# Patient Record
Sex: FEMALE | Race: WHITE | NOT HISPANIC OR LATINO | Employment: OTHER | URBAN - METROPOLITAN AREA
[De-identification: names, ages, dates, MRNs, and addresses within clinical notes are randomized per-mention and may not be internally consistent; named-entity substitution may affect disease eponyms.]

---

## 2017-01-03 ENCOUNTER — APPOINTMENT (OUTPATIENT)
Dept: PHYSICAL THERAPY | Facility: CLINIC | Age: 67
End: 2017-01-03
Payer: MEDICARE

## 2017-01-03 ENCOUNTER — APPOINTMENT (OUTPATIENT)
Dept: OCCUPATIONAL THERAPY | Facility: CLINIC | Age: 67
End: 2017-01-03
Payer: MEDICARE

## 2017-01-03 PROCEDURE — 97112 NEUROMUSCULAR REEDUCATION: CPT

## 2017-01-03 PROCEDURE — 97535 SELF CARE MNGMENT TRAINING: CPT

## 2017-01-06 ENCOUNTER — APPOINTMENT (OUTPATIENT)
Dept: PHYSICAL THERAPY | Facility: CLINIC | Age: 67
End: 2017-01-06
Payer: MEDICARE

## 2017-01-06 ENCOUNTER — APPOINTMENT (OUTPATIENT)
Dept: OCCUPATIONAL THERAPY | Facility: CLINIC | Age: 67
End: 2017-01-06
Payer: MEDICARE

## 2017-01-06 PROCEDURE — G8990 OTHER PT/OT CURRENT STATUS: HCPCS

## 2017-01-06 PROCEDURE — G8992 OTHER PT/OT  D/C STATUS: HCPCS

## 2017-01-06 PROCEDURE — 97535 SELF CARE MNGMENT TRAINING: CPT

## 2017-01-06 PROCEDURE — 97112 NEUROMUSCULAR REEDUCATION: CPT

## 2017-01-06 PROCEDURE — G8991 OTHER PT/OT GOAL STATUS: HCPCS

## 2017-01-10 ENCOUNTER — APPOINTMENT (OUTPATIENT)
Dept: OCCUPATIONAL THERAPY | Facility: CLINIC | Age: 67
End: 2017-01-10
Payer: MEDICARE

## 2017-01-10 ENCOUNTER — APPOINTMENT (OUTPATIENT)
Dept: PHYSICAL THERAPY | Facility: CLINIC | Age: 67
End: 2017-01-10
Payer: MEDICARE

## 2017-01-10 PROCEDURE — 97112 NEUROMUSCULAR REEDUCATION: CPT

## 2017-01-13 ENCOUNTER — APPOINTMENT (OUTPATIENT)
Dept: PHYSICAL THERAPY | Facility: CLINIC | Age: 67
End: 2017-01-13
Payer: MEDICARE

## 2017-01-13 ENCOUNTER — APPOINTMENT (OUTPATIENT)
Dept: OCCUPATIONAL THERAPY | Facility: CLINIC | Age: 67
End: 2017-01-13
Payer: MEDICARE

## 2017-01-13 PROCEDURE — 97112 NEUROMUSCULAR REEDUCATION: CPT

## 2017-03-17 ENCOUNTER — HOSPITAL ENCOUNTER (OUTPATIENT)
Dept: RADIOLOGY | Age: 67
Discharge: HOME | End: 2017-03-17
Payer: MEDICARE

## 2017-03-17 DIAGNOSIS — Z12.31 ENCOUNTER FOR SCREENING MAMMOGRAM FOR MALIGNANT NEOPLASM OF BREAST: ICD-10-CM

## 2017-03-17 PROCEDURE — G0202 SCR MAMMO BI INCL CAD: HCPCS

## 2017-03-27 ENCOUNTER — ALLSCRIPTS OFFICE VISIT (OUTPATIENT)
Dept: OTHER | Facility: OTHER | Age: 67
End: 2017-03-27

## 2017-04-22 ENCOUNTER — APPOINTMENT (OUTPATIENT)
Dept: LAB | Facility: CLINIC | Age: 67
End: 2017-04-22
Payer: MEDICARE

## 2017-04-22 ENCOUNTER — TRANSCRIBE ORDERS (OUTPATIENT)
Dept: LAB | Facility: CLINIC | Age: 67
End: 2017-04-22

## 2017-04-22 DIAGNOSIS — R73.09 OTHER ABNORMAL GLUCOSE: ICD-10-CM

## 2017-04-22 DIAGNOSIS — E21.3 HYPERPARATHYROIDISM (HCC): ICD-10-CM

## 2017-04-22 DIAGNOSIS — E78.5 HYPERLIPIDEMIA: ICD-10-CM

## 2017-04-22 LAB
ALBUMIN SERPL BCP-MCNC: 3.7 G/DL (ref 3.5–5)
ALP SERPL-CCNC: 57 U/L (ref 46–116)
ALT SERPL W P-5'-P-CCNC: 26 U/L (ref 12–78)
ANION GAP SERPL CALCULATED.3IONS-SCNC: 8 MMOL/L (ref 4–13)
AST SERPL W P-5'-P-CCNC: 20 U/L (ref 5–45)
BASOPHILS # BLD AUTO: 0.05 THOUSANDS/ΜL (ref 0–0.1)
BASOPHILS NFR BLD AUTO: 1 % (ref 0–1)
BILIRUB SERPL-MCNC: 0.8 MG/DL (ref 0.2–1)
BUN SERPL-MCNC: 19 MG/DL (ref 5–25)
CALCIUM SERPL-MCNC: 9.4 MG/DL (ref 8.3–10.1)
CHLORIDE SERPL-SCNC: 108 MMOL/L (ref 100–108)
CHOLEST SERPL-MCNC: 120 MG/DL (ref 50–200)
CO2 SERPL-SCNC: 27 MMOL/L (ref 21–32)
CREAT SERPL-MCNC: 0.87 MG/DL (ref 0.6–1.3)
EOSINOPHIL # BLD AUTO: 0.24 THOUSAND/ΜL (ref 0–0.61)
EOSINOPHIL NFR BLD AUTO: 5 % (ref 0–6)
ERYTHROCYTE [DISTWIDTH] IN BLOOD BY AUTOMATED COUNT: 14 % (ref 11.6–15.1)
EST. AVERAGE GLUCOSE BLD GHB EST-MCNC: 114 MG/DL
GFR SERPL CREATININE-BSD FRML MDRD: >60 ML/MIN/1.73SQ M
GLUCOSE P FAST SERPL-MCNC: 96 MG/DL (ref 65–99)
HBA1C MFR BLD: 5.6 % (ref 4.2–6.3)
HCT VFR BLD AUTO: 40.9 % (ref 34.8–46.1)
HDLC SERPL-MCNC: 47 MG/DL (ref 40–60)
HGB BLD-MCNC: 13.6 G/DL (ref 11.5–15.4)
LDLC SERPL DIRECT ASSAY-MCNC: 68 MG/DL (ref 0–100)
LYMPHOCYTES # BLD AUTO: 1.95 THOUSANDS/ΜL (ref 0.6–4.47)
LYMPHOCYTES NFR BLD AUTO: 43 % (ref 14–44)
MCH RBC QN AUTO: 30.2 PG (ref 26.8–34.3)
MCHC RBC AUTO-ENTMCNC: 33.3 G/DL (ref 31.4–37.4)
MCV RBC AUTO: 91 FL (ref 82–98)
MONOCYTES # BLD AUTO: 0.34 THOUSAND/ΜL (ref 0.17–1.22)
MONOCYTES NFR BLD AUTO: 8 % (ref 4–12)
NEUTROPHILS # BLD AUTO: 1.98 THOUSANDS/ΜL (ref 1.85–7.62)
NEUTS SEG NFR BLD AUTO: 43 % (ref 43–75)
PLATELET # BLD AUTO: 196 THOUSANDS/UL (ref 149–390)
PMV BLD AUTO: 10 FL (ref 8.9–12.7)
POTASSIUM SERPL-SCNC: 4.6 MMOL/L (ref 3.5–5.3)
PROT SERPL-MCNC: 6.6 G/DL (ref 6.4–8.2)
PTH-INTACT SERPL-MCNC: 60.1 PG/ML (ref 14–72)
RBC # BLD AUTO: 4.51 MILLION/UL (ref 3.81–5.12)
SODIUM SERPL-SCNC: 143 MMOL/L (ref 136–145)
TRIGL SERPL-MCNC: 90 MG/DL
WBC # BLD AUTO: 4.56 THOUSAND/UL (ref 4.31–10.16)

## 2017-04-22 PROCEDURE — 80053 COMPREHEN METABOLIC PANEL: CPT

## 2017-04-22 PROCEDURE — 80061 LIPID PANEL: CPT

## 2017-04-22 PROCEDURE — 83036 HEMOGLOBIN GLYCOSYLATED A1C: CPT

## 2017-04-22 PROCEDURE — 36415 COLL VENOUS BLD VENIPUNCTURE: CPT

## 2017-04-22 PROCEDURE — 83970 ASSAY OF PARATHORMONE: CPT

## 2017-04-22 PROCEDURE — 83721 ASSAY OF BLOOD LIPOPROTEIN: CPT

## 2017-04-22 PROCEDURE — 85025 COMPLETE CBC W/AUTO DIFF WBC: CPT

## 2017-05-10 ENCOUNTER — TRANSCRIBE ORDERS (OUTPATIENT)
Dept: ADMINISTRATIVE | Facility: HOSPITAL | Age: 67
End: 2017-05-10

## 2017-05-10 ENCOUNTER — ALLSCRIPTS OFFICE VISIT (OUTPATIENT)
Dept: OTHER | Facility: OTHER | Age: 67
End: 2017-05-10

## 2017-05-10 DIAGNOSIS — M81.0 AGE-RELATED OSTEOPOROSIS WITHOUT CURRENT PATHOLOGICAL FRACTURE: ICD-10-CM

## 2017-05-10 DIAGNOSIS — R68.84 JAW PAIN: ICD-10-CM

## 2017-05-10 DIAGNOSIS — M89.9 DISORDER OF BONE: ICD-10-CM

## 2017-05-18 ENCOUNTER — HOSPITAL ENCOUNTER (OUTPATIENT)
Dept: RADIOLOGY | Age: 67
Discharge: HOME | End: 2017-05-18
Payer: MEDICARE

## 2017-05-18 DIAGNOSIS — M81.0 AGE-RELATED OSTEOPOROSIS WITHOUT CURRENT PATHOLOGICAL FRACTURE: ICD-10-CM

## 2017-05-18 DIAGNOSIS — M89.9 DISORDER OF BONE: ICD-10-CM

## 2017-05-18 PROCEDURE — 77080 DXA BONE DENSITY AXIAL: CPT

## 2017-05-31 ENCOUNTER — HOSPITAL ENCOUNTER (OUTPATIENT)
Dept: MRI IMAGING | Facility: HOSPITAL | Age: 67
Discharge: HOME | End: 2017-05-31
Payer: MEDICARE

## 2017-05-31 DIAGNOSIS — R68.84 JAW PAIN: ICD-10-CM

## 2017-05-31 PROCEDURE — 70543 MRI ORBT/FAC/NCK W/O &W/DYE: CPT

## 2017-05-31 PROCEDURE — A9585 GADOBUTROL INJECTION: HCPCS | Performed by: RADIOLOGY

## 2017-05-31 RX ADMIN — GADOBUTROL 7 ML: 604.72 INJECTION INTRAVENOUS at 16:35

## 2017-06-26 ENCOUNTER — ALLSCRIPTS OFFICE VISIT (OUTPATIENT)
Dept: OTHER | Facility: OTHER | Age: 67
End: 2017-06-26

## 2017-06-26 DIAGNOSIS — R05.9 COUGH: ICD-10-CM

## 2017-06-26 DIAGNOSIS — R51 HEADACHE(784.0): ICD-10-CM

## 2017-06-27 ENCOUNTER — APPOINTMENT (OUTPATIENT)
Dept: LAB | Facility: CLINIC | Age: 67
End: 2017-06-27
Payer: MEDICARE

## 2017-06-27 ENCOUNTER — TRANSCRIBE ORDERS (OUTPATIENT)
Dept: LAB | Facility: CLINIC | Age: 67
End: 2017-06-27

## 2017-06-27 DIAGNOSIS — R51 HEADACHE(784.0): ICD-10-CM

## 2017-06-27 DIAGNOSIS — R05.9 COUGH: ICD-10-CM

## 2017-06-27 PROCEDURE — 86003 ALLG SPEC IGE CRUDE XTRC EA: CPT

## 2017-06-27 PROCEDURE — 82785 ASSAY OF IGE: CPT

## 2017-06-27 PROCEDURE — 36415 COLL VENOUS BLD VENIPUNCTURE: CPT

## 2017-06-28 LAB
A ALTERNATA IGE QN: <0.1 KUA/I
A FUMIGATUS IGE QN: <0.1 KUA/I
ALLERGEN COMMENT: ABNORMAL
BERMUDA GRASS IGE QN: <0.1 KUA/I
BOXELDER IGE QN: <0.1 KUA/I
C HERBARUM IGE QN: <0.1 KUA/I
CAT DANDER IGE QN: <0.1 KUA/I
CMN PIGWEED IGE QN: <0.1 KUA/I
COMMON RAGWEED IGE QN: 0.98 KUA/I
COTTONWOOD IGE QN: <0.1 KUA/I
D FARINAE IGE QN: <0.1 KUA/I
D PTERONYSS IGE QN: <0.1 KUA/I
DOG DANDER IGE QN: <0.1 KUA/I
LONDON PLANE IGE QN: <0.1 KUA/I
MOUSE URINE PROT IGE QN: <0.1 KUA/I
MT JUNIPER IGE QN: <0.1 KUA/I
MUGWORT IGE QN: 0.1 KUA/I
P NOTATUM IGE QN: <0.1 KUA/I
ROACH IGE QN: <0.1 KUA/I
SHEEP SORREL IGE QN: <0.1 KUA/I
SILVER BIRCH IGE QN: <0.1 KUA/I
TIMOTHY IGE QN: <0.1 KUA/I
TOTAL IGE SMQN RAST: 38.6 KU/L (ref 0–113)
WALNUT IGE QN: <0.1 KUA/I
WHITE ASH IGE QN: <0.1 KUA/I
WHITE ELM IGE QN: <0.1 KUA/I
WHITE MULBERRY IGE QN: <0.1 KUA/I
WHITE OAK IGE QN: <0.1 KUA/I

## 2017-07-03 ENCOUNTER — HOSPITAL ENCOUNTER (OUTPATIENT)
Dept: CT IMAGING | Facility: HOSPITAL | Age: 67
Discharge: HOME | End: 2017-07-03
Payer: MEDICARE

## 2017-07-03 DIAGNOSIS — R51 HEADACHE(784.0): ICD-10-CM

## 2017-07-03 PROCEDURE — 70486 CT MAXILLOFACIAL W/O DYE: CPT

## 2017-09-12 ENCOUNTER — ALLSCRIPTS OFFICE VISIT (OUTPATIENT)
Dept: OTHER | Facility: OTHER | Age: 67
End: 2017-09-12

## 2017-09-12 DIAGNOSIS — Q21.1 ATRIAL SEPTAL DEFECT: ICD-10-CM

## 2017-09-12 DIAGNOSIS — R00.2 PALPITATIONS: ICD-10-CM

## 2017-09-12 DIAGNOSIS — E78.5 HYPERLIPIDEMIA: ICD-10-CM

## 2017-09-12 DIAGNOSIS — I48.91 ATRIAL FIBRILLATION (HCC): ICD-10-CM

## 2017-09-28 ENCOUNTER — HOSPITAL ENCOUNTER (OUTPATIENT)
Dept: NON INVASIVE DIAGNOSTICS | Facility: CLINIC | Age: 67
Discharge: HOME | End: 2017-09-28
Payer: MEDICARE

## 2017-09-28 DIAGNOSIS — Q21.1 ATRIAL SEPTAL DEFECT: ICD-10-CM

## 2017-09-28 DIAGNOSIS — E78.5 HYPERLIPIDEMIA: ICD-10-CM

## 2017-09-28 DIAGNOSIS — R00.2 PALPITATIONS: ICD-10-CM

## 2017-09-28 DIAGNOSIS — I48.91 ATRIAL FIBRILLATION (HCC): ICD-10-CM

## 2017-09-28 PROCEDURE — 93306 TTE W/DOPPLER COMPLETE: CPT

## 2017-10-01 DIAGNOSIS — E78.5 HYPERLIPIDEMIA: ICD-10-CM

## 2017-10-01 DIAGNOSIS — M81.0 AGE-RELATED OSTEOPOROSIS WITHOUT CURRENT PATHOLOGICAL FRACTURE: ICD-10-CM

## 2017-10-01 DIAGNOSIS — E55.9 VITAMIN D DEFICIENCY: ICD-10-CM

## 2017-11-01 ENCOUNTER — APPOINTMENT (OUTPATIENT)
Dept: LAB | Facility: CLINIC | Age: 67
End: 2017-11-01
Payer: MEDICARE

## 2017-11-01 DIAGNOSIS — M81.0 AGE-RELATED OSTEOPOROSIS WITHOUT CURRENT PATHOLOGICAL FRACTURE: ICD-10-CM

## 2017-11-01 DIAGNOSIS — E55.9 VITAMIN D DEFICIENCY: ICD-10-CM

## 2017-11-01 DIAGNOSIS — E78.5 HYPERLIPIDEMIA: ICD-10-CM

## 2017-11-01 LAB
25(OH)D3 SERPL-MCNC: 37.4 NG/ML (ref 30–100)
ALBUMIN SERPL BCP-MCNC: 3.7 G/DL (ref 3.5–5)
ALP SERPL-CCNC: 98 U/L (ref 46–116)
ALT SERPL W P-5'-P-CCNC: 28 U/L (ref 12–78)
ANION GAP SERPL CALCULATED.3IONS-SCNC: 7 MMOL/L (ref 4–13)
AST SERPL W P-5'-P-CCNC: 17 U/L (ref 5–45)
BASOPHILS # BLD AUTO: 0.04 THOUSANDS/ΜL (ref 0–0.1)
BASOPHILS NFR BLD AUTO: 1 % (ref 0–1)
BILIRUB SERPL-MCNC: 0.9 MG/DL (ref 0.2–1)
BUN SERPL-MCNC: 24 MG/DL (ref 5–25)
CALCIUM SERPL-MCNC: 9.7 MG/DL (ref 8.3–10.1)
CHLORIDE SERPL-SCNC: 110 MMOL/L (ref 100–108)
CHOLEST SERPL-MCNC: 127 MG/DL (ref 50–200)
CO2 SERPL-SCNC: 27 MMOL/L (ref 21–32)
CREAT SERPL-MCNC: 0.88 MG/DL (ref 0.6–1.3)
EOSINOPHIL # BLD AUTO: 0.25 THOUSAND/ΜL (ref 0–0.61)
EOSINOPHIL NFR BLD AUTO: 5 % (ref 0–6)
ERYTHROCYTE [DISTWIDTH] IN BLOOD BY AUTOMATED COUNT: 13.9 % (ref 11.6–15.1)
GFR SERPL CREATININE-BSD FRML MDRD: 67 ML/MIN/1.73SQ M
GLUCOSE P FAST SERPL-MCNC: 96 MG/DL (ref 65–99)
HCT VFR BLD AUTO: 43.1 % (ref 34.8–46.1)
HDLC SERPL-MCNC: 52 MG/DL (ref 40–60)
HGB BLD-MCNC: 14.1 G/DL (ref 11.5–15.4)
LDLC SERPL DIRECT ASSAY-MCNC: 76 MG/DL (ref 0–100)
LYMPHOCYTES # BLD AUTO: 2.03 THOUSANDS/ΜL (ref 0.6–4.47)
LYMPHOCYTES NFR BLD AUTO: 37 % (ref 14–44)
MCH RBC QN AUTO: 30.1 PG (ref 26.8–34.3)
MCHC RBC AUTO-ENTMCNC: 32.7 G/DL (ref 31.4–37.4)
MCV RBC AUTO: 92 FL (ref 82–98)
MONOCYTES # BLD AUTO: 0.48 THOUSAND/ΜL (ref 0.17–1.22)
MONOCYTES NFR BLD AUTO: 9 % (ref 4–12)
NEUTROPHILS # BLD AUTO: 2.69 THOUSANDS/ΜL (ref 1.85–7.62)
NEUTS SEG NFR BLD AUTO: 48 % (ref 43–75)
PLATELET # BLD AUTO: 221 THOUSANDS/UL (ref 149–390)
PMV BLD AUTO: 9.9 FL (ref 8.9–12.7)
POTASSIUM SERPL-SCNC: 4.7 MMOL/L (ref 3.5–5.3)
PROT SERPL-MCNC: 7 G/DL (ref 6.4–8.2)
PTH-INTACT SERPL-MCNC: 82.6 PG/ML (ref 14–72)
RBC # BLD AUTO: 4.68 MILLION/UL (ref 3.81–5.12)
SODIUM SERPL-SCNC: 144 MMOL/L (ref 136–145)
TRIGL SERPL-MCNC: 80 MG/DL
WBC # BLD AUTO: 5.49 THOUSAND/UL (ref 4.31–10.16)

## 2017-11-01 PROCEDURE — 82306 VITAMIN D 25 HYDROXY: CPT

## 2017-11-01 PROCEDURE — 83970 ASSAY OF PARATHORMONE: CPT

## 2017-11-01 PROCEDURE — 85025 COMPLETE CBC W/AUTO DIFF WBC: CPT

## 2017-11-01 PROCEDURE — 83721 ASSAY OF BLOOD LIPOPROTEIN: CPT

## 2017-11-01 PROCEDURE — 80053 COMPREHEN METABOLIC PANEL: CPT

## 2017-11-01 PROCEDURE — 80061 LIPID PANEL: CPT

## 2017-11-01 PROCEDURE — 36415 COLL VENOUS BLD VENIPUNCTURE: CPT

## 2017-11-13 ENCOUNTER — ALLSCRIPTS OFFICE VISIT (OUTPATIENT)
Dept: OTHER | Facility: OTHER | Age: 67
End: 2017-11-13

## 2017-11-14 NOTE — PROGRESS NOTES
Assessment  PlanSectionStart Plan  Influenza vaccine given  Obtain blood work prior to next visit  Trying decrease use of Rhinocort if possible Call office if noting any chest pain or pressure with activity  Call office if noting any weakness of one arm or leg compared to the other  Call office if noting any numbness of one arm or leg compared to the other  Call office if noting any blurred or double vision   History of Present Illness  HPI: Reviewed several issues today  She had an episode of vomiting which she attributes to ingestion of milk  She has a known history of lactose intolerance which she feels is becoming more of an issue as she ages  She did not have any diarrhea or abdominal pain  said her facial symptoms have resolved since she started Rhinocort nasal spray  She is continuing on that the trying to decrease dosing  CT scan of sinuses with coronal views was done showing clear sinuses other than deviation of the nasal septum  Allergy blood work is normal other than mild allergies to mugwort and ragweed,  has known osteoporosis  Will be due for subsequent bone density study in June of 2019  As noted most recent study showed spine of -1 7 and hip improved to -2  She has not had any further jaw pain  She has not had any the previously noted chest pain  She denies chest pain or pressure with activity  spirits relatively well  She denies mild anxiety or depressive symptoms  Laboratory testing done prior to this visit showed normal CBC and chemistry profile %period% creatinine is 0 8  Cholesterol 127, triglycerides 80, LDL 76, vitamin-D therapeutic at 37, HDL 52, PTH mildly elevated at 83  reviewed her parathyroid hormone levels for the last 2 years-day of fluctuated but her overall relatively stable  As noted at this point despite the elevated PTH calcium level including corrected calcium level is normal     patient wanted to know their preferred analgesic agent   Because of their various comorbidities I recommended that this be acetaminophen  This patient has no history of chronic liver disease that would put them at greater risk for use of acetaminophen  This patient may use up to 500-650 mg of acetaminophen at a time and no more than 3 g a day total  Nonsteroidal anti-inflammatory agents have the potential to exacerbate hypertension, hypercoagulability, chronic renal failure, congestive heart failure, and various allergic tendencies  Because of her comorbidities we wanted to use acetaminophen rather than nonsteroidals      Review of Systems  Complete-Female:  Constitutional: No fever, no chills, feels well, no tiredness, no recent weight gain or weight loss  Eyes: No complaints of eye pain, no red eyes, no eyesight problems, no discharge, no dry eyes, no itching of eyes  ENT: Jaw painâhad fullnessâfacial fullness, but-- no earache,-- no nosebleeds,-- no sore throat,-- no hearing loss,-- no nasal discharge-- and-- no hoarseness  Cardiovascular: No complaints of slow heart rate, no fast heart rate, no chest pain, no palpitations, no leg claudication, no lower extremity edema  Respiratory: cough,-- wheezing-- and-- shortness of breath during exertion, but-- no shortness of breath,-- no orthopnea-- and-- no PND  Gastrointestinal: No complaints of abdominal pain, no constipation, no nausea or vomiting, no diarrhea, no bloody stools  Genitourinary: No complaints of dysuria, no incontinence, no pelvic pain, no dysmenorrhea, no vaginal discharge or bleeding  Musculoskeletal: arthralgias,-- joint swelling-- and-- joint stiffness, but-- no limb pain-- and-- no limb swelling  Integumentary: No complaints of skin rash or lesions, no itching, no skin wounds, no breast pain or lump  Neurological: dizziness, but-- no headache,-- no numbness,-- no tingling,-- no confusion,-- no limb weakness,-- no convulsions,-- no fainting-- and-- no difficulty walking    Psychiatric: Not suicidal, no sleep disturbance, no anxiety or depression, no change in personality, no emotional problems  Endocrine: No complaints of proptosis, no hot flashes, no muscle weakness, no deepening of the voice, no feelings of weakness  Hematologic/Lymphatic: No complaints of swollen glands, no swollen glands in the neck, does not bleed easily, does not bruise easily  Active Problems    1  Abnormal movement in bone (733 90) (M89 9)   2  Acid reflux (530 81) (K21 9)   3  Allergy (995 3) (T78 40XA)   4  Anticoagulant long-term use (V58 61) (Z79 01)   5  Arthritis (716 90) (M19 90)   6  Atrial fibrillation (427 31) (I48 91)   7  Cough (786 2) (R05)   8  Dizziness (780 4) (R42)   9  Dystrophy of vulva (624 09) (N90 4)   10  Facial pain (784 0) (R51)   11  Headache (784 0) (R51)   12  Hyperlipidemia (272 4) (E78 5)   13  Hyperparathyroidism (252 00) (E21 3)   14  Insomnia (780 52) (G47 00)   15  Joint pain, knee (719 46) (M25 569)   16  Labia irritation (624 8) (N90 89)   17  Left knee pain (719 46) (M25 562)   18  Mandible pain (784 92) (R68 84)   19  Muscle spasm (728 85) (M62 838)   20  Need for pneumococcal vaccination (V03 82) (Z23)   21  Osteoporosis (733 00) (M81 0)   22  Palpitations (785 1) (R00 2)   23  Patent foramen ovale (745 5) (Q21 1)   24  Prediabetes (790 29) (R73 09)   25  Primary osteoarthritis of left knee (715 16) (M17 12)   26  Right knee pain (719 46) (M25 561)   27  Skin rash (782 1) (R21)   28  Tear of lateral cartilage or meniscus of knee, current (836 1) (S83 289A)   29  Vitamin D deficiency (268 9) (E55 9)    Past Medical History  1  History of Acute deep vein thrombosis of lower limb, unspecified laterality   2  History of Cerebrovascular disease, ill-defined, acute (436) (I67 89)   3  History of Degeneration of cervical intervertebral disc (722 4) (M50 90)   4  History of Encounter for cervical Pap smear with pelvic exam (V76 2,V72 31) (Z01 419)   5   History of Encounter for other preprocedural examination (V72 83) (Z01 818)   6  History of Encounter for routine gynecological examination (V72 31) (Z01 419)   7  History of  3 (V22 2) (Z33 1)   8  History of High risk medication use (V58 69) (Z79 899)   9  History of cataract (V12 49) (Z86 69)   10  History of diarrhea (V12 79) (Z87 898)   11  History of headache (V13 89) (Z87 898)   12  History of hyperglycemia (V12 29) (Z86 39)   13  History of hypertension (V12 59) (Z86 79)   14  History of migraine (V12 49) (Z86 69)   15  History of Papanicolaou smear (V45 89) (Z98 890)   16  History of sciatica (V12 49) (Z86 69)   17  History of Osteomalacia (268 2)   18  History of Patent foramen ovale (745 5) (Q21 1)   19  History of Pneumonia (V12 61)   20  History of Polyp of sigmoid colon (211 3) (D12 5)   21  History of Stroke Syndrome (436)   22  History of Stroke Syndrome (436)   23  History of Visit for screening mammogram (Z50 23) (Z12 31)  Active Problems And Past Medical History Reviewed: The active problems and past medical history were reviewed and updated today  Surgical History  1  History of Adenoidectomy   2  History of Appendectomy   3  History of Breast Surgery   4  History of Cataract Surgery   5  History of Cervical Loop Electrosurgical Excision (LEEP)   6  History of Cholecystectomy   7  History of Dilation And Curettage   8  History of Hernia Repair   9  History of Hysterectomy   10  History of Patent Foramen Ovale Closure Percutaneous   11  History of Rotator Cuff Repair   12  History of Tonsillectomy   13  History of Total Abdominal Hysterectomy   14  History of Treatment Of Pelvis   15  History of Tubal Ligation   16  History of Umbilical Hernia Repair  Surgical History Reviewed: The surgical history was reviewed and updated today  Family History  Mother    1  Family history of Arthritis (V17 7)   2  Family history of Atrial Fibrillation   3  Family history of Diabetes Mellitus (V18 0)   4   Family history of Heart Disease (V17 49) 5  Family history of Hypertension (V17 49)   6  Family history of Mitral Valve Disorder   7  Family history of Thyroid Disorder (V18 19)  Father    6  Family history of Asthma (V17 5)   9  Family history of Cancer   10  Family history of Diabetes Mellitus (V18 0)   11  Family history of Heart Disease (V17 49)   12  Family history of Hypertension (V17 49)  Brother    15  Family history of diabetes mellitus (V18 0) (Z83 3)  Family History    14  Family history of Atherosclerosis (V17 49)   15  Family history of Cerebral Palsy   16  Family history of Hyperlipidemia   16  Family history of Osteoporosis (V17 81)    Social History     · Denied: History of Being A Social Drinker   · Caffeine Use   · Daily Coffee Consumption (1  Cups/Day)   · Denied: History of Drug Use   · Former smoker (V15 82) (P66 499)   · Job Hazardous   · Job Physical Requirements Heavy Lifting   · No drug use   · Occupation: Medical Professional   · Work-related Stress (V62 1)  Social History Reviewed: The social history was reviewed and updated today  The social history was reviewed and is unchanged  Current Meds   1  Amoxicillin 500 MG Oral Capsule; 4 tablets 1 hours before dental work; Therapy: 09NDH0442 to (Last Rx:04Oct2016)  Requested for: 04Oct2016; Status: ACTIVE - Transmit to Pharmacy - Awaiting Verification Ordered   2  Calcium 600-D TABS; TAKE AS DIRECTED; Therapy: (Recorded:06Jan2014) to Recorded   3  Clotrimazole-Betamethasone 1-0 05 % External Cream; APPLY  AND RUB  IN A THIN FILM TO AFFECTED AREAS TWICE DAILY  (AM AND PM); Therapy: 85JPT9376 to (Last Rx:90Wpc2749)  Requested for: 04Oct2016; Status: ACTIVE - Transmit to Pharmacy - Awaiting Verification Ordered   4  [facility]S Vitamin C 1000 MG Oral Tablet; TAKE 1 TABLET DAILY; Therapy: (Recorded:24Soz2483) to Recorded   5  EpiPen 2-Eliceo 0 3 MG/0 3ML Injection Solution Auto-injector; INJECT 0 3ML INTRAMUSCULARLY AS DIRECTED;  Therapy: 38CWM0110 to (Last Rx:91Llr3048)  Requested for: 12BWX3595; Status: ACTIVE - Transmit to Pharmacy - Awaiting Verification Ordered   6  Magnesium 250 MG Oral Tablet; TAKE 1 TABLET DAILY; Therapy: 85CFL2720 to Recorded   7  Meclizine HCl - 12 5 MG Oral Tablet; TAKE 1 TABLET BY MOUTH THREE TIMES DAILY AS NEEDED FOR DIZZINESS; Therapy: 49Lsa1656 to (Evaluate:10Xpv0633)  Requested for: 73BEO0604; Last Rx:58Whv9227; Status: ACTIVE - Transmit to [facility] Verification Ordered   8  Metaxalone 400 MG Oral Tablet; TAKE ONE TABLET EVERY 8 HOURS PRN; Therapy: 20KQE9206 to (Last SS:83NUX9630)  Requested for: 27Jun2017 Ordered   9  Pravastatin Sodium 10 MG Oral Tablet; TAKE 1 TABLET DAILY AS DIRECTED; Therapy: 33Ylc8474 to (Sybil Gómez)  Requested for: 56UCD1025; Last Rx:37Ifz6850 Ordered   10  Rhinocort Aqua 32 MCG/ACT SUSP (Budesonide); USE 2 SPRAYS IN EACH NOSTRIL ONCE DAILY; Therapy: (Recorded:33Tnd8360) to Recorded   11  Skelaxin 800 MG Oral Tablet (Metaxalone); prn Recorded   12  Vitamin D 1000 UNIT Oral Tablet; bid; Therapy: [phone]) to Recorded   13  Xarelto 20 MG Oral Tablet; take 1 tablet by mouth daily; Therapy: 06Cuc2621 to (Jaclyn Garcia)  Requested for: 70ZKU3823; Last Rx:06Mar2017  Ordered  Medication List Reviewed: The medication list was reviewed and updated today  Allergies    1  Bactrim DS TABS   2  CeleBREX CAPS   3  Indocin CAPS   4  Tetracyclines   5  Ultram TABS   6  Carbocaine SOLN   7  Codeine Derivatives   8  Colace CAPS   9  Cortisone POWD   10  Flexeril TABS   11  Heparin   12  Inderal TABS   13  Influenza A (H1N1) Monoval PF SUSP   14  lorazepam   15  Lovenox SOLN   16  Macrobid CAPS   17  Neomycin Sulfate POWD   18  Neosporin Original OINT   19  Vioxx TABS  20  Adhesive Tape   21  Chocolate   22  Other   23   Shellfish    Vitals  Vital Signs    Recorded: 15MRC7017 11:07AM Recorded: 57ZJW6509 10:42AM   Heart Rate 72    Respiration 14    Systolic 866    Diastolic 72    Height  5 ft 4 in   Weight  172 lb BMI Calculated  29 52   BSA Calculated  1 83       Physical Exam   Constitutional  General appearance: No acute distress, well appearing and well nourished  Head and Face  Head and face: Normal    Palpation of the face and sinuses: No sinus tenderness  Eyes  Conjunctiva and lids: No swelling, erythema or discharge  Pupils and irises: Equal, round, reactive to light  Ears, Nose, Mouth, and Throat  External inspection of ears and nose: Normal    Otoscopic examination: Tympanic membranes translucent with normal light reflex  Canals patent without erythema  Hearing: Normal    Nasal mucosa, septum, and turbinates: Normal without edema or erythema  Lips, teeth, and gums: Normal, good dentition  Oropharynx: Normal with no erythema, edema, exudate or lesions  Neck  Neck: Supple, symmetric, trachea midline, no masses  Thyroid: Normal, no thyromegaly  Pulmonary  Respiratory effort: No increased work of breathing or signs of respiratory distress  Percussion of chest: Normal    Palpation of chest: Normal    Auscultation of lungs: Abnormal  -- Diffuse rhonchi with an expiratory wheezing  Cardiovascular  Palpation of heart: Normal PMI, no thrills  Auscultation of heart: Normal rate and rhythm, normal S1 and S2, no murmurs  Carotid pulses: 2+ bilaterally  Abdominal aorta: Normal    Femoral pulses: 2+ bilaterally  Pedal pulses: 2+ bilaterally  Peripheral vascular exam: Normal    Examination of extremities for edema and/or varicosities: Normal    Chest  Chest: Normal    Abdomen  Abdomen: Non-tender, no masses  Liver and spleen: No hepatomegaly or splenomegaly  Examination for hernias: No hernia appreciated  Anus, perineum, and rectum: Normal sphincter tone, no masses, no prolapse  Stool sample for occult blood: Negative  Genitourinary  External genitalia and vagina: Normal, no lesions appreciated  Urethra: Normal, no discharge  Bladder: Not distended, no tenderness     Cervix: Normal, no lesions  Uterus: Normal size, no tenderness, no masses  Adnexa/Parametria: Normal, no masses or tenderness  Lymphatic  Palpation of lymph nodes in neck: No lymphadenopathy  Palpation of lymph nodes in axillae: No lymphadenopathy  Palpation of lymph nodes in groin: No lymphadenopathy  Palpation of lymph nodes in other areas: No lymphadenopathy  Musculoskeletal  Gait and station: Normal    Digits and nails: Normal without clubbing or cyanosis  Joints, bones, and muscles: Normal    Range of motion: Normal    Stability: Normal    Muscle strength/tone: Normal    Skin  Skin and subcutaneous tissue: Normal without rashes or lesions  Palpation of skin and subcutaneous tissue: Normal turgor  Neurologic  Cranial nerves: Cranial nerves II-XII intact  Cortical function: Normal mental status  Reflexes: 2+ and symmetric  Sensation: No sensory loss  Coordination: Normal finger to nose and heel to shin  Psychiatric  Judgment and insight: Normal    Orientation to person, place, and time: Normal    Recent and remote memory: Intact     Mood and affect: Normal        Signatures   Electronically signed by : CHEN Norris ; Nov 13 2017 11:17AM EST                       (Author)

## 2018-01-10 NOTE — PROGRESS NOTES
Chief Complaint  Patient here for Prolia injection  Active Problems    1  Anticoagulant long-term use (V58 61) (Z79 01)   2  Arthritis (716 90) (M19 90)   3  Atrial fibrillation (427 31) (I48 91)   4  Cough (786 2) (R05)   5  Dizziness (780 4) (R42)   6  Dystrophy of vulva (624 09) (N90 4)   7  Esophageal reflux (530 81) (K21 9)   8  Headache (784 0) (R51)   9  Hyperlipidemia (272 4) (E78 5)   10  Hyperparathyroidism (252 00) (E21 3)   11  Insomnia (780 52) (G47 00)   12  Joint pain, knee (719 46) (M25 569)   13  Labia irritation (624 8) (N90 89)   14  Left knee pain (719 46) (M25 562)   15  Osteoporosis (733 00) (M81 0)   16  Palpitations (785 1) (R00 2)   17  Patent foramen ovale (745 5) (Q21 1)   18  Prediabetes (790 29) (R73 09)   19  Primary osteoarthritis of left knee (715 16) (M17 12)   20  Right knee pain (719 46) (M25 561)   21  Tear of lateral cartilage or meniscus of knee, current (836 1) (Z30 231M)    Current Meds   1  Amoxicillin 500 MG Oral Capsule; 4 tablets 1 hours before dental work; Therapy: 26ZVM9574 to (Last Rx:10Nov2015)  Requested for: 10TWS3321 Ordered   2  Calcium 600-D TABS; TAKE AS DIRECTED; Therapy: (Recorded:06Jan2014) to Recorded   3  CoQ-10 200 MG Oral Capsule; Therapy: (Recorded:35Unz3788) to Recorded   4  [facility]S Vitamin C 1000 MG Oral Tablet; TAKE 1 TABLET DAILY; Therapy: (Recorded:02Apr2013) to Recorded   5  Fish Oil 1200 MG Oral Capsule; 1 PO BID; Therapy: (Recorded:02Apr2013) to Recorded   6  Magnesium 250 MG Oral Tablet; TAKE 1 TABLET DAILY; Therapy: 44ORM8045 to Recorded   7  Meclizine HCl - 12 5 MG Oral Tablet; TAKE 1 TABLET BY MOUTH THREE TIMES DAILY   AS NEEDED FOR DIZZINESS; Therapy: 88EOR9692 to (0431 35 06 90)  Requested for: 85Org4443; Last   Rx:54Ctd1320 Ordered   8  Pravastatin Sodium 10 MG Oral Tablet; take 1 tablet by mouth every day; Therapy: 14Apr2014 to (Evaluate:10Jun2016)  Requested for: 99OQZ8593; Last   Rx:93Fyh1244 Ordered   9  Skelaxin 800 MG Oral Tablet (Metaxalone); prn Recorded   10  Stool Softener TABS; Therapy: (Anthony Boozer) to Recorded   11  Vitamin D 1000 UNIT Oral Tablet; bid; Therapy: [phone]) to Recorded   12  Xarelto 20 MG Oral Tablet; take 1 tablet by mouth daily; Therapy: 11Dgx2069 to (Evaluate:09Lyq6477)  Requested for: 62Uyv3273; Last    Rx:96Tyx9317 Ordered    Allergies    1  Bactrim DS TABS   2  CeleBREX CAPS   3  Indocin CAPS   4  Tetracyclines   5  Ultram TABS   6  Carbocaine SOLN   7  Codeine Derivatives   8  Colace CAPS   9  Cortisone POWD   10  Flexeril TABS   11  Heparin   12  Inderal TABS   13  Influenza A (H1N1) Monoval PF SUSP   14  Lovenox SOLN   15  Macrobid CAPS   16  Neomycin Sulfate POWD   17  Neosporin Original OINT   18  Vioxx TABS    19  Adhesive Tape   20  Chocolate   21  Other   22  Shellfish    Assessment  ABN and consent reviewed and signed  Patient reports having left rib discomfort with shooting pains at times about 2 months after last injection that lasted for approx 2 weeks  Denies accompanying c/o  Instructed to discuss with provider at upcoming visit  Confirms taking Ca and Vit D supplements as ordered  Prolia 60mg SQ given in left upper arm as ordered  Tolerated well  Plan  Osteoporosis    · Prolia 60 MG/ML Subcutaneous Solution    Use Tylenol/Ibuprofen as needed  Call office with any problems  Repeat in 6 months  Verbalized understanding  Future Appointments    Date/Time Provider Specialty Site   11/15/2016 09:50 AM CHEN Asif  Endocrinology [facility] ENDOCRINOLOGY   09/28/2016 10:00 AM Pablito Monzon DO Cardiology [facility] CARDIOLOGY [facility]   11/09/2016 10:30 AM CHEN Can   Internal Medicine Ricci Mcmillan MD     Signatures   Electronically signed by : CHEN Escamilla ; Aug 30 2016  7:37AM EST

## 2018-01-11 NOTE — PROGRESS NOTES
Chief Complaint   Patient here for Prolia injection and bloodwork  Active Problems    1  Anticoagulant long-term use (V58 61) (Z79 01)   2  Arthritis (716 90) (M19 90)   3  Atrial fibrillation (427 31) (I48 91)   4  Dizziness (780 4) (R42)   5  Dystrophy of vulva (624 09) (N90 4)   6  GERD (gastroesophageal reflux disease) (530 81) (K21 9)   7  Headache (784 0) (R51)   8  Hyperlipidemia (272 4) (E78 5)   9  Hyperparathyroidism (252 00) (E21 3)   10  Insomnia (780 52) (G47 00)   11  Joint pain, knee (719 46) (M25 569)   12  Labia irritation (624 8) (N90 89)   13  Left knee pain (719 46) (M25 562)   14  Osteoporosis (733 00) (M81 0)   15  Palpitations (785 1) (R00 2)   16  Patent foramen ovale (745 5) (Q21 1)   17  Prediabetes (790 29) (R73 09)   18  Primary osteoarthritis of left knee (715 16) (M17 12)   19  Right knee pain (719 46) (M25 561)   20  Tear of lateral cartilage or meniscus of knee, current (836 1) (X64 173M)    Current Meds   1  Amoxicillin 500 MG Oral Capsule; 4 tablets 1 hours before dental work; Therapy: 59PXK4043 to (Last Rx:10Nov2015)  Requested for: 18CPO9988 Ordered   2  Calcium 600-D TABS; TAKE AS DIRECTED; Therapy: (Recorded:06Jan2014) to Recorded   3  CoQ-10 200 MG Oral Capsule; Therapy: (Recorded:47Fjv0680) to Recorded   4  CVS Vitamin C 1000 MG Oral Tablet; TAKE 1 TABLET DAILY; Therapy: (Recorded:02Apr2013) to Recorded   5  Fish Oil 1200 MG Oral Capsule; 1 PO BID; Therapy: (Recorded:02Apr2013) to Recorded   6  Magnesium 250 MG Oral Tablet; TAKE 1 TABLET DAILY; Therapy: 28KWQ8360 to Recorded   7  Meclizine HCl - 12 5 MG Oral Tablet; TAKE 1 TABLET BY MOUTH THREE TIMES DAILY   AS NEEDED FOR DIZZINESS; Therapy: 41AWI0958 to (05 12 73 93 30)  Requested for: 50Mvp0767; Last   Rx:43Ymr8884 Ordered   8  Pravastatin Sodium 10 MG Oral Tablet; take 1 tablet by mouth every day; Therapy: 14Apr2014 to (Evaluate:10Jun2016)  Requested for: 55CIW5370;  Last   Rx:13Vwa6968 Ordered 9  Skelaxin 800 MG Oral Tablet (Metaxalone); prn Recorded   10  Stool Softener TABS; Therapy: (Hoa Calhoun) to Recorded   11  Vitamin D 1000 UNIT Oral Tablet; bid; Therapy: [phone]) to Recorded   12  Xarelto 20 MG Oral Tablet; take 1 tablet by mouth daily; Therapy: 23Wgt8512 to (Evaluate:16Rnr6749)  Requested for: 09Jlq5341; Last    Rx:05Mcw3092 Ordered    Allergies    1  Bactrim DS TABS   2  CeleBREX CAPS   3  Indocin CAPS   4  Tetracyclines   5  Ultram TABS   6  Carbocaine SOLN   7  Codeine Derivatives   8  Colace CAPS   9  Cortisone POWD   10  Flexeril TABS   11  Heparin   12  Inderal TABS   13  Influenza A (H1N1) Monoval PF Intramuscular Suspension   14  Lovenox SOLN   15  Macrobid CAPS   16  Neomycin Sulfate POWD   17  Neosporin Original OINT   18  Vioxx TABS    19  Adhesive Tape   20  Chocolate   21  Other   22  Shellfish    Assessment   ABN and consent reviewed and signed  Denies questions or problems with previous injection  Prolia 60mg SQ given in left upper arm  Tolerated well  Plan  Hyperparathyroidism    · (1) ALBUMIN; Status: In Progress - Specimen/Data Collected;   Done: 06QYL7112   · (1) CALCIUM; Status: In Progress - Specimen/Data Collected;   Done: 01IMZ6580   · (1) PTH N-TERMINAL (INTACT); Status: In Progress - Specimen/Data Collected;   Done:  59UKE8698   · (1) VITAMIN D 25-HYDROXY; Status: In Progress - Specimen/Data Collected;   Done:  86PQS5930   · Routine Venipuncture - POC; Status:Complete;   Done: 56TAL7586  Osteoporosis    · Prolia 60 MG/ML Subcutaneous Solution     Use Tylenol/Ibuprofen as needed  Call office with any problems  Repeat in 6 months  Verbalized understanding  Future Appointments    Date/Time Provider Specialty Site   11/14/2016 10:30 AM CHEN Arguelles   Endocrinology [facility] ENDOCRINOLOGY   04/08/2016 03:00 PM Sage West DO Cardiology [address]   08/25/2016 09:00 AM Endocrinology, Nurse Schedule [facility] ENDOCRINOLOGY   05/09/2016 10:00 AM CHEN Flynn   Internal Medicine Sujatha Motley MD     Signatures   Electronically signed by : CHEN Lyles ; Feb 23 2016  9:04AM EST

## 2018-01-12 VITALS — DIASTOLIC BLOOD PRESSURE: 74 MMHG | SYSTOLIC BLOOD PRESSURE: 118 MMHG | HEART RATE: 68 BPM | RESPIRATION RATE: 14 BRPM

## 2018-01-13 VITALS
BODY MASS INDEX: 29.27 KG/M2 | HEIGHT: 64 IN | WEIGHT: 171.44 LBS | HEART RATE: 57 BPM | SYSTOLIC BLOOD PRESSURE: 110 MMHG | DIASTOLIC BLOOD PRESSURE: 62 MMHG

## 2018-01-14 VITALS
OXYGEN SATURATION: 97 % | HEIGHT: 64 IN | DIASTOLIC BLOOD PRESSURE: 70 MMHG | HEART RATE: 69 BPM | SYSTOLIC BLOOD PRESSURE: 110 MMHG

## 2018-01-14 VITALS
WEIGHT: 171 LBS | HEIGHT: 64 IN | RESPIRATION RATE: 14 BRPM | SYSTOLIC BLOOD PRESSURE: 120 MMHG | BODY MASS INDEX: 29.19 KG/M2 | HEART RATE: 68 BPM | DIASTOLIC BLOOD PRESSURE: 78 MMHG

## 2018-01-15 VITALS
RESPIRATION RATE: 14 BRPM | SYSTOLIC BLOOD PRESSURE: 120 MMHG | HEIGHT: 64 IN | BODY MASS INDEX: 29.37 KG/M2 | WEIGHT: 172 LBS | HEART RATE: 72 BPM | DIASTOLIC BLOOD PRESSURE: 72 MMHG

## 2018-01-16 NOTE — RESULT NOTES
Message   Calcium, Vit D, PTH levels are al lnormal   continue current supplements and prolia     Verified Results  (1) ALBUMIN 55Xvi7479 04:45PM Arville Riser     Test Name Result Flag Reference   ALBUMIN 4 0 g/dL  3 5-5 0     (1) CALCIUM 22Fpx5960 04:45PM Arville Riser     Test Name Result Flag Reference   CALCIUM 9 3 mg/dL  8 3-10 1     (1) PTH N-TERMINAL (INTACT) 42BNZ1519 04:45PM Arville Riser     Test Name Result Flag Reference   PARATHYROID HORMONE INTACT 60 2 pg/mL  14 0-72 0     (1) VITAMIN D 25-HYDROXY 15DDD0540 04:45PM Arville Riser     Test Name Result Flag Reference   VIT D 25-HYDROX 31 9 ng/mL  30 0-100 0       Signatures   Electronically signed by : Juan J Kessler, [facility]; Feb 23 2016  9:20AM EST                       (Author)

## 2018-01-26 ENCOUNTER — OFFICE VISIT (OUTPATIENT)
Dept: OBGYN CLINIC | Facility: MEDICAL CENTER | Age: 68
End: 2018-01-26
Payer: MEDICARE

## 2018-01-26 ENCOUNTER — APPOINTMENT (OUTPATIENT)
Dept: RADIOLOGY | Facility: MEDICAL CENTER | Age: 68
End: 2018-01-26
Payer: MEDICARE

## 2018-01-26 VITALS
BODY MASS INDEX: 28.68 KG/M2 | SYSTOLIC BLOOD PRESSURE: 118 MMHG | HEART RATE: 74 BPM | HEIGHT: 64 IN | DIASTOLIC BLOOD PRESSURE: 74 MMHG | WEIGHT: 168 LBS

## 2018-01-26 DIAGNOSIS — M76.821 POSTERIOR TIBIAL TENDINITIS, RIGHT: ICD-10-CM

## 2018-01-26 DIAGNOSIS — M25.571 PAIN, JOINT, ANKLE AND FOOT, RIGHT: Primary | ICD-10-CM

## 2018-01-26 PROCEDURE — 20610 DRAIN/INJ JOINT/BURSA W/O US: CPT | Performed by: PHYSICIAN ASSISTANT

## 2018-01-26 PROCEDURE — 73610 X-RAY EXAM OF ANKLE: CPT

## 2018-01-26 PROCEDURE — 99212 OFFICE O/P EST SF 10 MIN: CPT | Performed by: ORTHOPAEDIC SURGERY

## 2018-01-26 PROCEDURE — 20605 DRAIN/INJ JOINT/BURSA W/O US: CPT | Performed by: PHYSICIAN ASSISTANT

## 2018-01-26 RX ORDER — CLOTRIMAZOLE AND BETAMETHASONE DIPROPIONATE 10; .64 MG/G; MG/G
CREAM TOPICAL 2 TIMES DAILY
COMMUNITY
Start: 2016-10-04 | End: 2018-11-12 | Stop reason: SDUPTHER

## 2018-01-26 RX ORDER — MECLIZINE HCL 12.5 MG/1
TABLET ORAL
Refills: 10 | COMMUNITY
Start: 2017-12-23 | End: 2018-05-18 | Stop reason: SDUPTHER

## 2018-01-26 RX ORDER — RIVAROXABAN 20 MG/1
TABLET, FILM COATED ORAL
Refills: 3 | COMMUNITY
Start: 2018-01-12 | End: 2018-03-21 | Stop reason: SDUPTHER

## 2018-01-26 RX ORDER — METAXALONE 400 MG/1
TABLET ORAL
COMMUNITY
Start: 2017-06-26 | End: 2018-11-12 | Stop reason: SDUPTHER

## 2018-01-26 RX ORDER — EPINEPHRINE 0.3 MG/.3ML
0.3 INJECTION SUBCUTANEOUS
COMMUNITY
Start: 2016-10-04 | End: 2019-05-16 | Stop reason: SDUPTHER

## 2018-01-26 RX ORDER — LIDOCAINE HYDROCHLORIDE 10 MG/ML
2 INJECTION, SOLUTION INFILTRATION; PERINEURAL
Status: COMPLETED | OUTPATIENT
Start: 2018-01-26 | End: 2018-01-26

## 2018-01-26 RX ORDER — PRAVASTATIN SODIUM 10 MG
TABLET ORAL
Refills: 3 | COMMUNITY
Start: 2017-12-11 | End: 2019-05-23 | Stop reason: SDUPTHER

## 2018-01-26 RX ORDER — MULTIVITAMIN WITH IRON
1 TABLET ORAL DAILY
COMMUNITY
Start: 2013-03-15 | End: 2021-11-30

## 2018-01-26 RX ORDER — AMOXICILLIN 500 MG/1
CAPSULE ORAL
COMMUNITY
Start: 2015-11-09 | End: 2018-11-12 | Stop reason: SDUPTHER

## 2018-01-26 RX ORDER — BETAMETHASONE SODIUM PHOSPHATE AND BETAMETHASONE ACETATE 3; 3 MG/ML; MG/ML
6 INJECTION, SUSPENSION INTRA-ARTICULAR; INTRALESIONAL; INTRAMUSCULAR; SOFT TISSUE
Status: COMPLETED | OUTPATIENT
Start: 2018-01-26 | End: 2018-01-26

## 2018-01-26 RX ADMIN — LIDOCAINE HYDROCHLORIDE 2 ML: 10 INJECTION, SOLUTION INFILTRATION; PERINEURAL at 15:33

## 2018-01-26 RX ADMIN — BETAMETHASONE SODIUM PHOSPHATE AND BETAMETHASONE ACETATE 6 MG: 3; 3 INJECTION, SUSPENSION INTRA-ARTICULAR; INTRALESIONAL; INTRAMUSCULAR; SOFT TISSUE at 15:33

## 2018-01-26 NOTE — PATIENT INSTRUCTIONS
You received an injection at today's visit    You may remove the Band-Aid in 1-2 hours secondary to skin sensitivity      You may take Benadryl as directed secondary to skin sensitivity    Your next follow-up appointment is recommended in 3 months

## 2018-01-26 NOTE — PROGRESS NOTES
Subjective    78-year-old female patient with focal pain to the medial instep of the foot,   Of insidious onset right foot  She points to a area that is focal to the medial talus if not dorsal navicular  She states that it is significantly tender and responds with pain to pressure   X-rays of the right ankle were obtained on arrival to the office  It is our privilege to assist her with a workup focal to her condition    Past Medical History:   Diagnosis Date    Disease of thyroid gland     DVT (deep venous thrombosis) ([address] )     Meniere syndrome     Osteoarthritis        Past Surgical History:   Procedure Laterality Date    APPENDECTOMY      BREAST BIOPSY      CARDIAC SURGERY      CATARACT EXTRACTION      CHOLECYSTECTOMY      DILATION AND CURETTAGE OF UTERUS      HERNIA REPAIR      HYSTERECTOMY      KNEE SURGERY      TUBAL LIGATION         Family History   Problem Relation Age of Onset    Heart disease Mother     Thyroid disease Mother     Glaucoma Mother     Dementia Mother     Diabetes Mother     Cancer Father     Diabetes Brother        Social History   Substance Use Topics    Smoking status: Never Smoker    Smokeless tobacco: Never Used    Alcohol use No     Physical exam  Immediately noted during the history is arthritic changes of her hands and wrist   She has no discoloration of her right foot or ankle  She has no palpable pain over the medial or lateral malleolar structures  Her right foot has an arch  She is exquisitely tender to direct palpation over a hardened area focal to the medial talar navicular articulation    This area does not restrict range of motion    X-rays:  Right ankle series, show degenerative changes in the area of the ankle and the talonavicular joint    Tendon injection  Date/Time: 1/26/2018 3:33 PM  Consent given by: patient  Timeout: Immediately prior to procedure a time out was called to verify the correct patient, procedure, equipment, support staff and site/side marked as required   Supporting Documentation  Indications: pain   Procedure Details  Location: ankle - R ankle  Needle size: 22 G  Ultrasound guidance: no  Medications administered: 2 mL lidocaine 1 %; 6 mg betamethasone acetate-betamethasone sodium phosphate 6 (3-3) mg/mL    Patient tolerance: patient tolerated the procedure well with no immediate complications  Dressing:  Sterile dressing applied      Impression:  Right medial based foot and ankle pain,   Right posterior tibial tendinitis    Plan:  A well placed focal injection was recommended and accepted by the patient  Follow-up appointment for 3 months would be warranted  Her exam was performed by and treatment rendered by the attending surgeon

## 2018-02-08 ENCOUNTER — TELEPHONE (OUTPATIENT)
Dept: CARDIOLOGY CLINIC | Facility: CLINIC | Age: 68
End: 2018-02-08

## 2018-02-08 NOTE — TELEPHONE ENCOUNTER
Edith Ro called, she is scheduled to have a tooth extracted in March w/ Dr Monserrat Benavidez  Pt needs an ok to proceed w/ procedure as well as a hold on Xarelto  Pt does have Amoxicillin at home  Please advise     C/b # [id number]   's office # [phone] fax # [phone]

## 2018-02-19 ENCOUNTER — TELEPHONE (OUTPATIENT)
Dept: INTERNAL MEDICINE CLINIC | Facility: CLINIC | Age: 68
End: 2018-02-19

## 2018-02-20 ENCOUNTER — DOCUMENTATION (OUTPATIENT)
Dept: INTERNAL MEDICINE CLINIC | Facility: CLINIC | Age: 68
End: 2018-02-20

## 2018-02-20 ENCOUNTER — OFFICE VISIT (OUTPATIENT)
Dept: URGENT CARE | Age: 68
End: 2018-02-20
Payer: MEDICARE

## 2018-02-20 VITALS
HEIGHT: 64 IN | HEART RATE: 87 BPM | WEIGHT: 172 LBS | BODY MASS INDEX: 29.37 KG/M2 | RESPIRATION RATE: 20 BRPM | TEMPERATURE: 98.5 F | OXYGEN SATURATION: 98 % | SYSTOLIC BLOOD PRESSURE: 120 MMHG | DIASTOLIC BLOOD PRESSURE: 80 MMHG

## 2018-02-20 DIAGNOSIS — J11.1 INFLUENZA-LIKE ILLNESS: Primary | ICD-10-CM

## 2018-02-20 PROCEDURE — G0463 HOSPITAL OUTPT CLINIC VISIT: HCPCS | Performed by: PREVENTIVE MEDICINE

## 2018-02-20 PROCEDURE — 99213 OFFICE O/P EST LOW 20 MIN: CPT | Performed by: PREVENTIVE MEDICINE

## 2018-02-20 RX ORDER — ALBUTEROL SULFATE 90 UG/1
2 AEROSOL, METERED RESPIRATORY (INHALATION) EVERY 6 HOURS PRN
Qty: 1 INHALER | Refills: 0 | Status: SHIPPED | OUTPATIENT
Start: 2018-02-20 | End: 2018-04-18 | Stop reason: ALTCHOICE

## 2018-02-20 RX ORDER — OSELTAMIVIR PHOSPHATE 75 MG/1
75 CAPSULE ORAL EVERY 12 HOURS SCHEDULED
Qty: 10 CAPSULE | Refills: 0 | Status: SHIPPED | OUTPATIENT
Start: 2018-02-20 | End: 2018-02-25

## 2018-02-20 NOTE — PROGRESS NOTES
Patient called with symptoms of URTI for 3 days with congestion and some cough ear fullness-patient was informed that this is likely viral with symptomatic therapy recommended  She will use Afrin nasal spray for 3 days and Robitussin DM    If she notes persistent symptoms or increase toxicity she will call back

## 2018-02-20 NOTE — PROGRESS NOTES
[address]        NAME: Nelly Gottron is a 69 y o  female  : 1950    MRN: [id number]  DATE: 2018  TIME: 3:21 PM    Assessment and Plan   Influenza-like illness [R69]  1  Influenza-like illness  oseltamivir (TAMIFLU) 75 mg capsule    albuterol (PROVENTIL HFA,VENTOLIN HFA) 90 mcg/act inhaler         Patient Instructions     Patient Instructions   Take Tamiflu as directed  Use inhaler as needed for wheezing and cough  Fluids encouraged  Rest     Tylenol as needed for body aches and pains fever chills  If develops any chest pain and significant shortness of breath, seek further medical attention through the emergency room  Otherwise follow up with family doctor as needed  May try plain Mucinex as cough expectorant  Take with full glass fluid  Chief Complaint     Chief Complaint   Patient presents with    Earache     sinus pressure  for 1 week     Headache    Cough     for 5 days    Wheezing         History of Present Illness   Nelly Gottron presents to the clinic c/o    76-year-old female that started Friday night with typical like cough, malaise, arthralgias, fatigue  She spent all day Saturday in bed  Denies any fever or chills  She has some wheezing off and on her chest as well as the considerable cough  History of pneumonia on 2 occasions  Denies history of asthma  She did have flu vaccine this year  She does not tolerate influenza testing as she has deviated nasal septum and she is on a blood center  Last time she had testing she blew out large clots from her nose afterwards  She tried to get in her family medical office but was unable to  Review of Systems   Review of Systems   Constitutional: Positive for activity change, appetite change, diaphoresis and fatigue  Negative for chills and fever  HENT: Positive for congestion, postnasal drip, rhinorrhea and sore throat  Eyes: Negative      Respiratory: Positive for cough, chest tightness, shortness of breath and wheezing  Cardiovascular: Negative for chest pain, palpitations and leg swelling  Gastrointestinal: Negative  Decreased appetite   Musculoskeletal: Positive for arthralgias and myalgias  Skin: Negative for rash           Current Medications     Long-Term Prescriptions   Medication Sig Dispense Refill    ascorbic acid (CVS VITAMIN C) 1000 MG tablet Take 1 tablet by mouth daily      clotrimazole-betamethasone (LOTRISONE) 1-0 05 % cream Apply topically Twice daily      EPINEPHrine (EPIPEN 2-NAIN) 0 3 mg/0 3 mL SOAJ Inject 0 3 mL as directed      Magnesium 250 MG TABS Take 1 tablet by mouth daily      meclizine (ANTIVERT) 12 5 MG tablet TAKE 1 TABLET 3 TIMES DAILY AS NEEDED FOR DIZZINESS  10    metaxalone (SKELAXIN) 400 MG tablet Take by mouth      pravastatin (PRAVACHOL) 10 mg tablet TK 1 T PO D UTD  3    XARELTO 20 MG tablet TK 1 T PO D  3       Current Allergies     Allergies as of 02/20/2018 - Reviewed 02/20/2018   Allergen Reaction Noted    Chocolate  10/15/2012    Codeine sulfate  07/31/2013    Cortisone  07/31/2013    Cyclobenzaprine  07/31/2013    Docusate  09/26/2013    Enoxaparin  07/31/2013    Heparin  07/31/2013    Influenza a (h1n1) monovalent vaccine  10/12/2012    Lorazepam  06/26/2017    Neomycin sulfate  [neomycin]  07/31/2013    Neomycin-bacitracin zn-polymyx  10/12/2012    Nitrofurantoin  07/31/2013    Other  07/31/2013    Propranolol  09/26/2013    Rofecoxib  10/12/2012    Shellfish allergy  10/15/2012    Tramadol  10/12/2012    Celecoxib Rash 10/12/2012    Indomethacin Palpitations 10/12/2012    Sulfamethoxazole-trimethoprim Rash 10/12/2012            The following portions of the patient's history were reviewed and updated as appropriate: allergies, current medications, past family history, past medical history, past social history, past surgical history and problem list     Objective   /80   Pulse 87   Temp 98 5 °F (36 9 °C) (Temporal)   Resp 20   Ht 5' 4" (1 626 m)   Wt 78 kg (172 lb)   SpO2 98%   BMI 29 52 kg/m²        Physical Exam     Physical Exam   Constitutional: She is oriented to person, place, and time  She appears well-developed and well-nourished  No distress  Appears acutely ill but in no acute distress   HENT:   Head: Normocephalic and atraumatic  Right Ear: External ear normal    Left Ear: External ear normal    Cobblestoning of posterior pharynx with patchy redness  No exudate or fetid breath  Nasal turbinates red and edematous  No purulent mucus   Eyes: Conjunctivae and EOM are normal  Pupils are equal, round, and reactive to light  Right eye exhibits no discharge  Left eye exhibits no discharge  Neck: Normal range of motion  Neck supple  Cardiovascular: Normal rate, regular rhythm and normal heart sounds  Exam reveals no gallop and no friction rub  No murmur heard  Pulmonary/Chest: Effort normal and breath sounds normal  No respiratory distress  She has no wheezes  She has no rales  Lymphadenopathy:     She has no cervical adenopathy  Neurological: She is alert and oriented to person, place, and time  Skin: Skin is warm and dry  No rash noted  She is not diaphoretic  Psychiatric: She has a normal mood and affect

## 2018-02-20 NOTE — TELEPHONE ENCOUNTER
Let patient know this is likely viral-95 percent of upper respiratory tract infections are-recommend adding Afrin nasal spray 2 squirts in each nostril twice daily for the next 3 days  Use Robitussin DM as needed for cough    If symptoms not improving over the next 72 hours or significantly worse with fever and toxicity she should call back

## 2018-02-20 NOTE — TELEPHONE ENCOUNTER
PATIENT CALLED EARLIER TODAY  STATING SHE HAS BEEN HAVING A COLD OVER THE WEEKEND  - cough w/u phlem (not sure of color)   -congested sinuses  - ear congestion  - scratchy throat    All around just not feeling good  Patient has taken tylenol otc    - her med allergies can be found under the tab

## 2018-02-20 NOTE — TELEPHONE ENCOUNTER
PT CALLED BACK , READ HER YOUR MESSAGE   SHE STATED SHE IS NOT IMPROVING   NOW SHE IS SNEEZING A LOT AND COUGHING         PLEASE ADVISE WHAT ELSE CAN SHE DO OR TAKE

## 2018-02-20 NOTE — PATIENT INSTRUCTIONS
Take Tamiflu as directed  Use inhaler as needed for wheezing and cough  Fluids encouraged  Rest     Tylenol as needed for body aches and pains fever chills  If develops any chest pain and significant shortness of breath, seek further medical attention through the emergency room  Otherwise follow up with family doctor as needed  May try plain Mucinex as cough expectorant  Take with full glass fluid

## 2018-03-21 DIAGNOSIS — I48.0 PAROXYSMAL ATRIAL FIBRILLATION (HCC): Primary | ICD-10-CM

## 2018-03-21 RX ORDER — RIVAROXABAN 20 MG/1
TABLET, FILM COATED ORAL
Qty: 90 TABLET | Refills: 0 | Status: SHIPPED | OUTPATIENT
Start: 2018-03-21 | End: 2019-01-03 | Stop reason: SDUPTHER

## 2018-04-04 ENCOUNTER — TRANSCRIBE ORDERS (OUTPATIENT)
Dept: RADIOLOGY | Facility: CLINIC | Age: 68
End: 2018-04-04

## 2018-04-06 ENCOUNTER — HOSPITAL ENCOUNTER (OUTPATIENT)
Dept: RADIOLOGY | Age: 68
Discharge: HOME | End: 2018-04-06
Payer: MEDICARE

## 2018-04-06 DIAGNOSIS — Z12.31 ENCOUNTER FOR SCREENING MAMMOGRAM FOR MALIGNANT NEOPLASM OF BREAST: ICD-10-CM

## 2018-04-06 PROCEDURE — 77067 SCR MAMMO BI INCL CAD: CPT

## 2018-04-18 ENCOUNTER — OFFICE VISIT (OUTPATIENT)
Dept: CARDIOLOGY CLINIC | Facility: CLINIC | Age: 68
End: 2018-04-18
Payer: MEDICARE

## 2018-04-18 VITALS
SYSTOLIC BLOOD PRESSURE: 126 MMHG | WEIGHT: 174.3 LBS | HEIGHT: 64 IN | HEART RATE: 68 BPM | OXYGEN SATURATION: 98 % | BODY MASS INDEX: 29.76 KG/M2 | DIASTOLIC BLOOD PRESSURE: 70 MMHG

## 2018-04-18 DIAGNOSIS — E78.2 MIXED HYPERLIPIDEMIA: ICD-10-CM

## 2018-04-18 DIAGNOSIS — Q21.1 PATENT FORAMEN OVALE: ICD-10-CM

## 2018-04-18 DIAGNOSIS — I48.91 ATRIAL FIBRILLATION, UNSPECIFIED TYPE (HCC): Primary | ICD-10-CM

## 2018-04-18 DIAGNOSIS — R00.2 PALPITATIONS: ICD-10-CM

## 2018-04-18 DIAGNOSIS — I65.22 CAROTID ARTERY PLAQUE, LEFT: ICD-10-CM

## 2018-04-18 PROCEDURE — 99214 OFFICE O/P EST MOD 30 MIN: CPT | Performed by: INTERNAL MEDICINE

## 2018-04-18 PROCEDURE — 93000 ELECTROCARDIOGRAM COMPLETE: CPT | Performed by: INTERNAL MEDICINE

## 2018-04-18 NOTE — PROGRESS NOTES
Cardiology Follow Up    Buck Lopez  [birth year]  [id number]  Kylee [address] [facility] [address] [place] [id number]    1  Atrial fibrillation, unspecified type ([address] )  POCT ECG    CANCELED: ECG 12 lead   2  Patent foramen ovale     3  Mixed hyperlipidemia  VAS carotid complete study   4  Palpitations     5  Carotid artery plaque, left  VAS carotid complete study       Discussion/Summary:  Overall she has been doing well since our last appointment  She has had a few brief episodes of palpitations that are quite short-lived  She has had some increased stress with health issues regarding her mother recently which may be contributing to these  She is already on anticoagulation  ECG today shows sinus rhythm  I think asked her to increase her hydration and fluid level which may help  Blood pressures been well controlled  Recent echocardiogram was reviewed  She is due for follow-up carotid Doppler in September on mild carotid stenosis on the left  History of PFO closure with normal echo  Interval History:   Routin follow-up visit  She has a history of paroxysmal atrial fibrillation, PFO status post closure device on anticoagulation  Overall she has been doing well  She reports a few rare palpitations  She describes a brief fluttering for a few minutes and then resolution  She has been under some increased stress dealing with the health care of her mother who is now in a nursing facility  She denies any chest pain, shortness of breath, lightheadedness, dizziness, or syncope  There has been no lower extremity edema, PND, orthopnea  Tolerating anticoagulation well            Problem List     Atrial fibrillation ([facility] )    Hyperlipidemia    Palpitations    Patent foramen ovale        Past Medical History:   Diagnosis Date    Cataract     Cerebrovascular disease, ill-defined, acute     10/13/15    Degeneration of cervical intervertebral disc     Disease of thyroid gland     D[facility] thrombosis) ([facility]     Hypertension     Meniere syndrome     Migraine     Osteoarthritis     Osteomalacia     Patent foramen ovale     s/p percutaneous closure device    Polyp of sigmoid colon     Sciatica     of the right leg    Stroke syndrome ([facility])      Social History     Social History    Marital status: /Civil Union     Spouse name: N/A    Number of children: N/A    Years of education: N/A     Occupational History    Medical Professional      Social History Main Topics    Smoking status: Never Smoker    Smokeless tobacco: Never Used      Comment: former smoker, per [employer]    Alcohol use No      Comment: doesnt drink now as per patient she did in the past, per [employer]    Drug use: No    Sexual activity: Not on file     Other Topics Concern    Not on file     Social History Narrative    Caffeine use, active    Daily coffee consumption, 1 cups/day    Job hazardous    Job physical requirements heavy lifting    Work related stress          Family History   Problem Relation Age of Onset    Heart disease Mother     Thyroid disease Mother      disorder    Glaucoma Mother     Dementia Mother     Diabetes Mother     Arthritis Mother     Atrial fibrillation Mother     Hypertension Mother     Mitral valve prolapse Mother      disorder    Cancer Father     Asthma Father     Diabetes Father     Heart disease Father     Hypertension Father     Diabetes Brother     Coronary artery disease Family     Cerebral palsy Family     Hyperlipidemia Family     Osteoporosis Family     Other Sister      PFO     Past Surgical History:   Procedure Laterality Date    ADENOIDECTOMY      APPENDECTOMY      BREAST BIOPSY      CARDIAC SURGERY      CATARACT EXTRACTION      CERVICAL BIOPSY  W/ LOOP ELECTRODE EXCISION      Mild Dysplasia, 6/17/14    CHOLECYSTECTOMY      DILATION AND CURETTAGE OF UTERUS      HERNIA REPAIR      HYSTERECTOMY      KNEE SURGERY      OTHER SURGICAL HISTORY      Treatment of Pelvis, "extensive pelvic surgery)    PATENT FORAMEN OVALE CLOSURE  08/15/2006    Percutaneous    ROTATOR CUFF REPAIR Bilateral     TONSILLECTOMY      TOTAL ABDOMINAL HYSTERECTOMY      6/17/14    TUBAL LIGATION      UMBILICAL HERNIA REPAIR      6/17/14       Current Outpatient Prescriptions:     amoxicillin (AMOXIL) 500 mg capsule, Take by mouth, Disp: , Rfl:     ascorbic acid (CVS VITAMIN C) 1000 MG tablet, Take 1 tablet by mouth daily, Disp: , Rfl:     Calcium Carb-Cholecalciferol (CALCIUM 1000 + D PO), Take by mouth, Disp: , Rfl:     clotrimazole-betamethasone (LOTRISONE) 1-0 05 % cream, Apply topically Twice daily, Disp: , Rfl:     EPINEPHrine (EPIPEN 2-NAIN) 0 3 mg/0 3 mL SOAJ, Inject 0 3 mL as directed, Disp: , Rfl:     Magnesium 250 MG TABS, Take 1 tablet by mouth daily, Disp: , Rfl:     meclizine (ANTIVERT) 12 5 MG tablet, TAKE 1 TABLET 3 TIMES DAILY AS NEEDED FOR DIZZINESS, Disp: , Rfl: 10    metaxalone (SKELAXIN) 400 MG tablet, Take by mouth, Disp: , Rfl:     pravastatin (PRAVACHOL) 10 mg tablet, TK 1 T PO D UTD, Disp: , Rfl: 3    XARELTO 20 MG tablet, TAKE 1 TABLET BY MOUTH DAILY, Disp: 90 tablet, Rfl: 0  Allergies   Allergen Reactions    Chocolate     Codeine Sulfate     Cortisone     Cyclobenzaprine     Docusate     Enoxaparin     Heparin     Influenza A (H1n1) Monovalent Vaccine     Lorazepam     Neomycin Sulfate  [Neomycin]     Neomycin-Bacitracin Zn-Polymyx     Nitrofurantoin     Other      Annotation - [id number]: Alcian  Adhesive tape    Propranolol     Rofecoxib     Shellfish Allergy     Tramadol      Other reaction(s): tight jaw    Celecoxib Rash    Indomethacin Palpitations    Sulfamethoxazole-Trimethoprim Rash       Labs:     Chemistry        Component Value Date/Time     11/01/2017 0916     10/07/2015 0848    K 4 7 11/01/2017 0916    K 4 6 10/07/2015 0848     (H) 11/01/2017 0916     (H) 10/07/2015 0848    CO2 27 11/01/2017 0916    CO2 28 10/07/2015 0848    BUN 24 11/01/2017 0916    BUN 23 10/07/2015 0848    CREATININE 0 88 11/01/2017 0916    CREATININE 0 75 10/07/2015 0848        Component Value Date/Time    CALCIUM 9 7 11/01/2017 0916    CALCIUM 9 4 10/07/2015 0848    ALKPHOS 98 11/01/2017 0916    ALKPHOS 41 (L) 10/07/2015 0848    AST 17 11/01/2017 0916    AST 13 10/07/2015 0848    ALT 28 11/01/2017 0916    ALT 29 10/07/2015 0848    BILITOT 0 90 11/01/2017 0916    BILITOT 0 70 10/07/2015 0848            Lab Results   Component Value Date    CHOL 127 11/01/2017    CHOL 120 04/22/2017    CHOL 139 10/26/2016     Lab Results   Component Value Date    HDL 52 11/01/2017    HDL 47 04/22/2017    HDL 45 10/26/2016     Lab Results   Component Value Date    LDLCALC 69 09/04/2014     Lab Results   Component Value Date    TRIG 80 11/01/2017    TRIG 90 04/22/2017    TRIG 90 10/26/2016     No components found for: CHOLHDL    Imaging: Mammo Screening Bilateral W Cad    Result Date: 4/6/2018  Narrative: Patient History: Patient is postmenopausal and has history of other cancer  Family history of premenopausal colorectal cancer at age 36 in maternal grandmother, breast cancer at age 80 and endometrial cancer at age 48 or over in paternal aunt, endometrial cancer at age 48 or over in paternal aunt, breast cancer at age 78 and endometrial cancer at age 61 in paternal cousin  Benign excisional biopsy of the left breast, 2000  Took hormonal contraceptives for 1 year  Took estrogen for 14 years  Patient is a former smoker, and smoked for 1 year  Patient's BMI is 29 2  Reason for exam: screening, asymptomatic  Mammo Screening Bilateral W CAD: April 6, 2018 - Check In #: [de-identified] Bilateral CC and MLO view(s) were taken  Technologist: RT Memo(R)(M) Prior study comparison: March 17, 2017, mammo screening bilateral W CAD performed at [address] [place] [address]  March 11, 2016, mammo screening bilateral W CAD performed at [address] [place] [address]  January 7, 2015, digital bilateral screening mammogram performed at [address] [place] [address]  January 30, 2013, digital bilateral screening mammogram performed at [address] [place] [address]  January 26, 2012, right breast unilateral diagnostic mammogram, performed at [address] [place] [address]  January 19, 2012, digital bilateral screening mammogram performed at [address] [place] [address]  There are scattered fibroglandular densities  No dominant soft tissue mass, architectural distortion or suspicious calcifications are noted in either breast   The skin and nipple contours are within normal limits  IMPRESSION: No evidence of malignancy  No significant changes when compared with prior studies  ACR BI-RADS® Assessments: BiRad:1 - Negative Recommendation: Routine screening mammogram of both breasts in 1 year  Analyzed by CAD The patient is scheduled in a reminder system for screening mammography  8-10% of cancers will be missed on mammography  Management of a palpable abnormality must be based on clinical grounds  Patients will be notified of their results via letter from our facility  Accredited by [employer] of Radiology and FDA  Transcription Location: [facility] Signing Station: [id number] Risk Value(s): Tyrer-Cuzick 10 Year: 3 700%, Tyrer-Cuzick Lifetime: 6 300%, Myriad Table: 1 5%, DMITRY 5 Year: 2 0%, NCI Lifetime: 6 0%, MRS : Based on personal and/or family history, consideration of hereditary risk assessment may be warranted  ECG:  Normal sinus rhythm unremarkable tracing      Review of Systems   Constitution: Negative  HENT: Negative  Eyes: Negative  Cardiovascular: Positive for palpitations  Negative for chest pain, dyspnea on exertion, leg swelling and syncope  Respiratory: Negative  Endocrine: Negative  Hematologic/Lymphatic: Negative  Skin: Negative  Musculoskeletal: Negative  Gastrointestinal: Negative  Genitourinary: Negative  Neurological: Negative  Psychiatric/Behavioral: Negative  Vitals:    04/18/18 1001   BP: 126/70   Pulse: 68   SpO2: 98%     Vitals:    04/18/18 1001   Weight: 79 1 kg (174 lb 4 8 oz)     Height: 5' 4" (162 6 cm)   Body mass index is 29 92 kg/m²  Physical Exam:  Vital signs reviewed    General appearance:  Appears stated age, alert, well appearing and in no distress  HEENT:  PERRLA, EOMI, no scleral icterus, no conjunctival pallor  NECK:  Supple, No elevated JVP, no thyromegaly, no carotid bruits  HEART:  Regular rate and rhythm, normal S1/S2, no S3/S4, no murmur or rub  LUNGS:  Clear to auscultation bilaterally, no wheezes rales or rhonchi  ABDOMEN:  Soft, non-tender, positive bowel sounds, no rebound or guarding, no organomegaly   EXTREMITIES:  No edema, normal range of motion  VASCULAR:  Normal pedal pulses, good pulse volume   SKIN: No lesions or rashes on exposed skin  NEURO:  CN II-XII intact, no focal deficits

## 2018-05-01 DIAGNOSIS — E21.3 HYPERPARATHYROIDISM (HCC): ICD-10-CM

## 2018-05-01 DIAGNOSIS — E78.5 HYPERLIPIDEMIA: ICD-10-CM

## 2018-05-07 ENCOUNTER — APPOINTMENT (OUTPATIENT)
Dept: LAB | Facility: CLINIC | Age: 68
End: 2018-05-07
Payer: MEDICARE

## 2018-05-07 DIAGNOSIS — E21.3 HYPERPARATHYROIDISM (HCC): ICD-10-CM

## 2018-05-07 DIAGNOSIS — E78.5 HYPERLIPIDEMIA: ICD-10-CM

## 2018-05-07 LAB
ALBUMIN SERPL BCP-MCNC: 3.7 G/DL (ref 3.5–5)
ALP SERPL-CCNC: 78 U/L (ref 46–116)
ALT SERPL W P-5'-P-CCNC: 35 U/L (ref 12–78)
ANION GAP SERPL CALCULATED.3IONS-SCNC: 8 MMOL/L (ref 4–13)
AST SERPL W P-5'-P-CCNC: 19 U/L (ref 5–45)
BASOPHILS # BLD AUTO: 0.03 THOUSANDS/ΜL (ref 0–0.1)
BASOPHILS NFR BLD AUTO: 1 % (ref 0–1)
BILIRUB SERPL-MCNC: 0.6 MG/DL (ref 0.2–1)
BUN SERPL-MCNC: 21 MG/DL (ref 5–25)
CALCIUM SERPL-MCNC: 9.1 MG/DL (ref 8.3–10.1)
CHLORIDE SERPL-SCNC: 110 MMOL/L (ref 100–108)
CHOLEST SERPL-MCNC: 136 MG/DL (ref 50–200)
CO2 SERPL-SCNC: 26 MMOL/L (ref 21–32)
CREAT SERPL-MCNC: 0.79 MG/DL (ref 0.6–1.3)
EOSINOPHIL # BLD AUTO: 0.18 THOUSAND/ΜL (ref 0–0.61)
EOSINOPHIL NFR BLD AUTO: 4 % (ref 0–6)
ERYTHROCYTE [DISTWIDTH] IN BLOOD BY AUTOMATED COUNT: 14 % (ref 11.6–15.1)
GFR SERPL CREATININE-BSD FRML MDRD: 77 ML/MIN/1.73SQ M
GLUCOSE P FAST SERPL-MCNC: 99 MG/DL (ref 65–99)
HCT VFR BLD AUTO: 41.6 % (ref 34.8–46.1)
HDLC SERPL-MCNC: 48 MG/DL (ref 40–60)
HGB BLD-MCNC: 13.7 G/DL (ref 11.5–15.4)
LDLC SERPL DIRECT ASSAY-MCNC: 83 MG/DL (ref 0–100)
LYMPHOCYTES # BLD AUTO: 1.68 THOUSANDS/ΜL (ref 0.6–4.47)
LYMPHOCYTES NFR BLD AUTO: 39 % (ref 14–44)
MCH RBC QN AUTO: 30 PG (ref 26.8–34.3)
MCHC RBC AUTO-ENTMCNC: 32.9 G/DL (ref 31.4–37.4)
MCV RBC AUTO: 91 FL (ref 82–98)
MONOCYTES # BLD AUTO: 0.31 THOUSAND/ΜL (ref 0.17–1.22)
MONOCYTES NFR BLD AUTO: 7 % (ref 4–12)
NEUTROPHILS # BLD AUTO: 2.14 THOUSANDS/ΜL (ref 1.85–7.62)
NEUTS SEG NFR BLD AUTO: 49 % (ref 43–75)
PLATELET # BLD AUTO: 209 THOUSANDS/UL (ref 149–390)
PMV BLD AUTO: 10.2 FL (ref 8.9–12.7)
POTASSIUM SERPL-SCNC: 4.9 MMOL/L (ref 3.5–5.3)
PROT SERPL-MCNC: 6.6 G/DL (ref 6.4–8.2)
PTH-INTACT SERPL-MCNC: 96.3 PG/ML (ref 18.4–80.1)
RBC # BLD AUTO: 4.56 MILLION/UL (ref 3.81–5.12)
SODIUM SERPL-SCNC: 144 MMOL/L (ref 136–145)
TRIGL SERPL-MCNC: 73 MG/DL
WBC # BLD AUTO: 4.34 THOUSAND/UL (ref 4.31–10.16)

## 2018-05-07 PROCEDURE — 36415 COLL VENOUS BLD VENIPUNCTURE: CPT

## 2018-05-07 PROCEDURE — 83721 ASSAY OF BLOOD LIPOPROTEIN: CPT

## 2018-05-07 PROCEDURE — 83970 ASSAY OF PARATHORMONE: CPT

## 2018-05-07 PROCEDURE — 80061 LIPID PANEL: CPT

## 2018-05-07 PROCEDURE — 80053 COMPREHEN METABOLIC PANEL: CPT

## 2018-05-07 PROCEDURE — 85025 COMPLETE CBC W/AUTO DIFF WBC: CPT

## 2018-05-10 RX ORDER — DESOXIMETASONE 2.5 MG/G
CREAM TOPICAL
Refills: 4 | COMMUNITY
Start: 2018-05-04 | End: 2018-11-12 | Stop reason: SDUPTHER

## 2018-05-13 PROBLEM — E55.9 VITAMIN D DEFICIENCY: Chronic | Status: ACTIVE | Noted: 2017-05-10

## 2018-05-13 PROBLEM — E55.9 VITAMIN D DEFICIENCY: Status: ACTIVE | Noted: 2017-05-10

## 2018-05-14 ENCOUNTER — OFFICE VISIT (OUTPATIENT)
Dept: INTERNAL MEDICINE CLINIC | Facility: CLINIC | Age: 68
End: 2018-05-14
Payer: MEDICARE

## 2018-05-14 VITALS
BODY MASS INDEX: 29.53 KG/M2 | DIASTOLIC BLOOD PRESSURE: 78 MMHG | WEIGHT: 173 LBS | HEIGHT: 64 IN | SYSTOLIC BLOOD PRESSURE: 128 MMHG | HEART RATE: 72 BPM | RESPIRATION RATE: 14 BRPM

## 2018-05-14 DIAGNOSIS — T78.40XS ALLERGIC STATE, SEQUELA: ICD-10-CM

## 2018-05-14 DIAGNOSIS — E55.9 VITAMIN D DEFICIENCY: ICD-10-CM

## 2018-05-14 DIAGNOSIS — R73.03 PREDIABETES: Chronic | ICD-10-CM

## 2018-05-14 DIAGNOSIS — E78.2 MIXED HYPERLIPIDEMIA: ICD-10-CM

## 2018-05-14 DIAGNOSIS — Q21.1 PATENT FORAMEN OVALE: Chronic | ICD-10-CM

## 2018-05-14 DIAGNOSIS — M81.0 OSTEOPOROSIS WITHOUT CURRENT PATHOLOGICAL FRACTURE, UNSPECIFIED OSTEOPOROSIS TYPE: Chronic | ICD-10-CM

## 2018-05-14 DIAGNOSIS — E21.3 HYPERPARATHYROIDISM (HCC): Primary | Chronic | ICD-10-CM

## 2018-05-14 PROCEDURE — 99215 OFFICE O/P EST HI 40 MIN: CPT | Performed by: INTERNAL MEDICINE

## 2018-05-14 RX ORDER — MULTIVIT-MIN/IRON/FOLIC ACID/K 18-600-40
CAPSULE ORAL 2 TIMES DAILY
COMMUNITY

## 2018-05-15 NOTE — PROGRESS NOTES
Assessment/Plan:  1  Health maintenance-potential candidate for herpes zoster vaccine-did not receive old vaccine because of neomycin allergy-checking with pharmacy to see if she can receive the new vaccine with this history of allergies  2  Health maintenance-gave her list of alternate providers to try and obtain metaxalone at a discounted price  3  Prior sensation of head fullness with concerned about hypertension-no evidence on exam-monitor  4  Facial fullness with ear fullness-developed in November 2016  Had been evaluated by [facility]  MRI of the brain showed no acoustic neuroma  Has a history of baseline tinnitus associated with Meniere's disease which she was told she had in the past   Dizziness was somewhat positional   Previously  Responded to Epley maneuver  CT scan of the sinuses showed clear sinuses other than deviation of nasal septum  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  She uses Rhinocort nasal spray as recommended by ENT  This point not an issue as noted in the past had tinnitus with left asymmetric hearing loss on audiogram  5  Jaw pain-she was worried it was related to Prolia  As noted CT scan shows DJD of the temporomandibular joints  MRI of the mandible showed mild DJD but no other abnormalities and no evidence of osteonecrosis  Radiology felt MRI was the preferred study to diagnose osteonecrosis of the jaw  6  Previously noted chest pain-began within 2 months of her treatment with Prolia and she is reluctant to use this again-pain resolved  7  Osteoporosis-prior bone density showed femur -2 4  Had 3 years of therapy with Prolia and then stopped it  Was on Coumadin previously but this was stopped in case contributing to her bone disease  DEXA scan in May 2017 shows L-spine -1 7 and hip improved to -2  Subsequent bone density study 2 years after last which would be June 2019  8    Hyperparathyroidism-parathyroid scan normal   Initially endocrinology felt this might be from inadequate calcium intake  She consumes large amounts of calcium and PTH level fluctuate  At this point monitoring  For repeat prior to next visit  9  Prediabetes-most recent A1c normal-for recheck in the future  10  Tachycardia-possible SVT-episode occurred 1 prior after injection  11  Esophageal reflux-dyspepsia-prior endoscopy negative for H pylori  Has been treated with PPI intermittently  Had headaches with Nexium and omeprazole  Felt inadequate treatment with H2 blocker  Felt better on AcipHex  Lost weight and symptoms improved  Has some associated lactose intolerance  12  Headaches noted previously-worsening with bending over  MRI of the brain normal   Flonase made her worse  Follow-up MRI repeated and normal   Had previously seen Urology-asymptomatic  13  Hyperlipidemia-continue current dose of statin  She thought fish oil led to itching  14  Episode of left arm numbness-possible TIA versus lacunar infarct  Transesophageal echo shows an intraseptal aneurysm with probable PF O  Doppler positive for DVT  EEG normal   MRA of the head normal   MRI of the head showed several tiny acute frontoparietal cortical infarcts  Had closure of her PFO-remains on Xarelto  15  DVT-full discussion in the past   Anticardiolipin antibodies negative  Unable then to measure other alabs because she was on anticoagulants  All other hypercoagulability studies negative  REVIEW NEXT VISIT-because of her history of atrial fibrillation and potential CVA cardiology felt she should be on lifelong anticoagulant  16  Elevated liver enzymes-resolved off Lipitor  17   Proximal atrial fibrillation-as noted remains in sinus rhythm and remains on anticoagulant      MEDICAL REGIMEN:      Calcium with vitamin-D, SBE prophylaxis with amoxicillin, Skelaxin using generic 400 milligrams every 8 hours as needed, vitamin D3/2000 units a day, pravastatin 10 milligrams-5 days per week, EpiPen as needed,Xarelto -20 milligrams daily Lotrisone cream b i d  p r n ,Desozimetazone appointment-0 25% b i d  p r n  Appointment over the next few months with prior labs  No problem-specific Assessment & Plan notes found for this encounter  Diagnoses and all orders for this visit:    Hyperparathyroidism ([address] )  -     PTH, intact; Future    Patent foramen ovale    Osteoporosis without current pathological fracture, unspecified osteoporosis type    Prediabetes    Mixed hyperlipidemia  -     Comprehensive metabolic panel; Future  -     Cholesterol, total; Future  -     Triglycerides; Future  -     HDL cholesterol; Future  -     LDL cholesterol, direct; Future  -     Vitamin D 25 hydroxy; Future  -     PTH, intact; Future    Allergic state, sequela    Vitamin D deficiency  -     Vitamin D 25 hydroxy; Future    Other orders  -     desoximetasone (TOPICORT) 0 25 % cream; REBECA AA BID PRN  -     Vitamin D, Cholecalciferol, 1000 units TABS; Take by mouth 2 (two) times a day          Subjective:      Patient ID: Rubi Ivey is a 69 y o  female  We reviewed multiple issues today  She had had prolonged respiratory illness  She was treated at an outpatient center told she had influenza  She had called the office in February with URTI symptoms  There is prior concerned of a sensation of head fullness and concerned about hypertension  There was no evidence of elevated BP on follow-up exam and BP today is stable    Labs done prior to this visit show elevated PTH at 96, LDL 83, HDL 48, triglycerides 73, chemistry profile normal with a creatinine 0 79 and normal fasting sugar, CBC normal    Mammogram had been done showing no issues and no evidence of dense breasts  She intermittently uses muscle relaxant-Metaxalone-she had an issue with Flexeril with an allergy to it  She is concerned about cost   We talked about obtaining it through different pharmaceutical distribute ears  She has known osteoporosis    Had 3 years of therapy with Prolia and then stop that is noted  Subsequent bone density studies due in June of 2019    She had a CT scan of her sinuses showed clear sinuses other than deviation of the nasal septum  She also DJD of the temporomandibular joints      This patient denies any systemic symptoms  Specifically there has been no evidence of fever, night sweats, significant weight loss or significant decrease in appetite  She has not had any the previously described dizziness  She denies weakness of 1 arm and leg compared to the other  She denies numbness of 1 arm and leg compared to the other  She denies blurred or double vision or difficulty with her speech    As noted she had previously not received herpes zoster vaccine  She has potentially candidate for the new zoster vaccine  As best I can tell there is no issue patients who have a history of allergy to neomycin with this vaccine  We will be contacting pharmacy  She commented today that if she is vomiting she can have vasovagal like reactions  Cardiology told her to drink a water supplement daily  Cardiology commented she had a few brief episodes of palpitations ever short-lived  EKG shows sinus rhythm  They recommended follow-up carotid Doppler in September and commented on a history of PFO closure with normal echo    She is under a large amount of stress  Her mother has dementia  She spent a large amount of time helping care for her mother  She is under large amount of stress because of this  This was a 40 minutes visit with more than 50% of the time spent counseling the patient formulating a treatment plan regarding her multiple medical issues as discussed above    We reviewed hyperparathyroidism, vaccination, influenza, stress with her mother situation, palpitations status SVT as well as her other medical issues as dictated below        The following portions of the patient's history were reviewed and updated as appropriate: current medications, past family history, past medical history, past social history, past surgical history and problem list     Review of Systems   Constitutional: Positive for fatigue  Respiratory: Negative  Cardiovascular: Positive for palpitations  Gastrointestinal: Negative  Endocrine: Negative  Genitourinary: Negative  Musculoskeletal: Negative  Neurological: Negative  Hematological: Negative  Psychiatric/Behavioral: The patient is nervous/anxious  Objective:      /78   Pulse 72   Resp 14   Ht 5' 4" (1 626 m)   Wt 78 5 kg (173 lb)   BMI 29 70 kg/m²          Physical Exam   Constitutional: She is oriented to person, place, and time  She appears well-developed and well-nourished  No distress  HENT:   Head: Normocephalic and atraumatic  Right Ear: External ear normal    Left Ear: External ear normal    Nose: Nose normal    Mouth/Throat: Oropharynx is clear and moist  No oropharyngeal exudate  Eyes: Conjunctivae and EOM are normal  Pupils are equal, round, and reactive to light  Right eye exhibits no discharge  Left eye exhibits no discharge  No scleral icterus  Neck: Normal range of motion  Neck supple  No JVD present  No tracheal deviation present  No thyromegaly present  Cardiovascular: Normal rate, regular rhythm, normal heart sounds and intact distal pulses  Exam reveals no gallop and no friction rub  No murmur heard  Pulmonary/Chest: Effort normal and breath sounds normal  No stridor  No respiratory distress  She has no wheezes  She has no rales  She exhibits no tenderness  Abdominal: Soft  Bowel sounds are normal  She exhibits no distension and no mass  There is no tenderness  There is no rebound and no guarding  Musculoskeletal: Normal range of motion  She exhibits no edema or deformity  Lymphadenopathy:     She has no cervical adenopathy  Neurological: She is alert and oriented to person, place, and time  She has normal reflexes  No cranial nerve deficit   She exhibits normal muscle tone  Coordination normal    Skin: Skin is warm and dry  No rash noted  No erythema  Psychiatric: She has a normal mood and affect  Her behavior is normal  Judgment and thought content normal    Vitals reviewed

## 2018-05-18 DIAGNOSIS — R42 DIZZINESS: Primary | ICD-10-CM

## 2018-05-20 RX ORDER — MECLIZINE HCL 12.5 MG/1
TABLET ORAL
Qty: 90 TABLET | Refills: 3 | Status: SHIPPED | OUTPATIENT
Start: 2018-05-20 | End: 2018-11-12 | Stop reason: SDUPTHER

## 2018-05-25 ENCOUNTER — OFFICE VISIT (OUTPATIENT)
Dept: OBGYN CLINIC | Facility: MEDICAL CENTER | Age: 68
End: 2018-05-25
Payer: MEDICARE

## 2018-05-25 VITALS
HEIGHT: 64 IN | WEIGHT: 167 LBS | SYSTOLIC BLOOD PRESSURE: 108 MMHG | HEART RATE: 76 BPM | DIASTOLIC BLOOD PRESSURE: 70 MMHG | BODY MASS INDEX: 28.51 KG/M2

## 2018-05-25 DIAGNOSIS — M17.11 PRIMARY OSTEOARTHRITIS OF RIGHT KNEE: Primary | ICD-10-CM

## 2018-05-25 DIAGNOSIS — M16.12 PRIMARY OSTEOARTHRITIS OF LEFT HIP: ICD-10-CM

## 2018-05-25 PROCEDURE — 99212 OFFICE O/P EST SF 10 MIN: CPT | Performed by: ORTHOPAEDIC SURGERY

## 2018-05-25 NOTE — PROGRESS NOTES
69 y o female returns today for re-check to her right ankle PT tendonitis and B/L knees  A there last visit her right PT tendon was injected with cortisone with resolution of her symptoms  Her knees do not bother her either and responded well to visco in the past   No new complaints and otherwise is pain free         Review of Systems  Review of systems negative unless otherwise specified in HPI    Past Medical History  Past Medical History:   Diagnosis Date    Acid reflux 11/9/2015    Cataract     Cerebrovascular disease, ill-defined, acute     10/13/15    Degeneration of cervical intervertebral disc     Disease of thyroid gland     DVT (deep venous thrombosis) (HCC)     Hypertension     Meniere syndrome     Migraine     Osteoarthritis     Osteomalacia     Osteoporosis 3/15/2013    Patent foramen ovale     s/p percutaneous closure device    Polyp of sigmoid colon     Sciatica     of the right leg    Stroke syndrome [facility])        Past Surgical History  Past Surgical History:   Procedure Laterality Date    ADENOIDECTOMY      APPENDECTOMY      BREAST BIOPSY      CARDIAC SURGERY      CATARACT EXTRACTION      CERVICAL BIOPSY  W/ LOOP ELECTRODE EXCISION      Mild Dysplasia, 6/17/14    CHOLECYSTECTOMY      DILATION AND CURETTAGE OF UTERUS      HERNIA REPAIR      HYSTERECTOMY      KNEE SURGERY      OTHER SURGICAL HISTORY      Treatment of Pelvis, "extensive pelvic surgery)    PATENT FORAMEN OVALE CLOSURE  08/15/2006    Percutaneous    ROTATOR CUFF REPAIR Bilateral     TONSILLECTOMY      TOTAL ABDOMINAL HYSTERECTOMY      6/17/14    TUBAL LIGATION      UMBILICAL HERNIA REPAIR      6/17/14       Current Medications  Current Outpatient Prescriptions on File Prior to Visit   Medication Sig Dispense Refill    amoxicillin (AMOXIL) 500 mg capsule Take by mouth      ascorbic acid ([employer]S VITAMIN C) 1000 MG tablet Take 1 tablet by mouth daily      Calcium Carb-Cholecalciferol (CALCIUM 1000 + D PO) Take by mouth      clotrimazole-betamethasone (LOTRISONE) 1-0 05 % cream Apply topically Twice daily      desoximetasone (TOPICORT) 0 25 % cream REBECA AA BID PRN  4    EPINEPHrine (EPIPEN 2-NAIN) 0 3 mg/0 3 mL SOAJ Inject 0 3 mL as directed      Magnesium 250 MG TABS Take 1 tablet by mouth daily      meclizine (ANTIVERT) 12 5 MG tablet TAKE 1 TABLET TID PRN FOR DIZZINESS 90 tablet 3    metaxalone (SKELAXIN) 400 MG tablet Take by mouth      pravastatin (PRAVACHOL) 10 mg tablet Take 5 times weekly  3    Vitamin D, Cholecalciferol, 1000 units TABS Take by mouth 2 (two) times a day      XARELTO 20 MG tablet TAKE 1 TABLET BY MOUTH DAILY 90 tablet 0     No current facility-administered medications on file prior to visit  Recent Labs [facility])    0  Lab Value Date/Time   HCT 41 6 05/07/2018 0944   HCT 40 5 04/29/2015 0932   HGB 13 7 05/07/2018 0944   HGB 13 5 04/29/2015 0932   WBC 4 34 05/07/2018 0944   WBC 4 98 04/29/2015 0932   INR 2 60 (H) 05/23/2014 1126   GLUCOSE 99 10/26/2016 0921   GLUCOSE 95 10/07/2015 0848   HGBA1C 5 6 04/22/2017 0834   HGBA1C 5 4 11/09/2016 1116         Physical exam  · General: Awake, Alert, Oriented  · Eyes: Pupils equal, round and reactive to light  · Heart: regular rate and rhythm  · Lungs: No audible wheezing  · Abdomen: soft    Right ankle:  Skin intact good STLT, no TTP  Good ROM and strength    B/L knees,   skin intact, mild swelling but no effusions, full ROM with patellar crepitus but no pain  Stable to varus/valgus stress  NVID    Imaging      Procedure      Assessment/Plan:   69 y  o female with no complaints today and her right foot/ankle symptoms resolved with her last cortisone injection  Her knees are doing well too and have visco previously with relief    With the absence of pain can't offer her anything to improve her symptoms  Activities as tolerated  WBAT  Follow-up PRN

## 2018-06-20 ENCOUNTER — TELEPHONE (OUTPATIENT)
Dept: CARDIOLOGY CLINIC | Facility: CLINIC | Age: 68
End: 2018-06-20

## 2018-06-20 NOTE — TELEPHONE ENCOUNTER
Pt scheduled for colonoscopy with Dr Yasmin Mahajan on 9/7  Ok to hold xarelto and for how long? Myron Khang said she thought she may need an antibiotic pre procedure  Abx needed? “You can access the Follow[facility] Patient Portal, offered by [facility], by registering with the following website: [email or web address]”

## 2018-09-05 ENCOUNTER — HOSPITAL ENCOUNTER (OUTPATIENT)
Dept: NON INVASIVE DIAGNOSTICS | Facility: CLINIC | Age: 68
Discharge: HOME | End: 2018-09-05
Payer: MEDICARE

## 2018-09-05 DIAGNOSIS — E78.2 MIXED HYPERLIPIDEMIA: ICD-10-CM

## 2018-09-05 DIAGNOSIS — I65.22 CAROTID ARTERY PLAQUE, LEFT: ICD-10-CM

## 2018-09-05 PROCEDURE — 93880 EXTRACRANIAL BILAT STUDY: CPT | Performed by: SURGERY

## 2018-09-05 PROCEDURE — 93880 EXTRACRANIAL BILAT STUDY: CPT

## 2018-09-06 ENCOUNTER — ANESTHESIA EVENT (OUTPATIENT)
Dept: GASTROENTEROLOGY | Facility: HOSPITAL | Age: 68
End: 2018-09-06
Payer: MEDICARE

## 2018-09-06 NOTE — ANESTHESIA PREPROCEDURE EVALUATION
Review of Systems/Medical History  Patient summary reviewed  Chart reviewed  No history of anesthetic complications     Cardiovascular  EKG reviewed, Exercise tolerance (METS): >4,  Hyperlipidemia, Dysrhythmias (PAF) , atrial fibrillation, DVT (H/o RLE DVT)  Comment: H/o PFO s/p closure,  Pulmonary  Negative pulmonary ROS        GI/Hepatic    GERD well controlled, Bowel prep       Negative  ROS        Endo/Other  Parathyroid disease hyperparathyroidism,      GYN       Hematology  Negative hematology ROS      Musculoskeletal  Osteoarthritis,   Arthritis     Neurology    CVA (2006) , no residual symptoms, Headaches (Migraines),    Psychology   Negative psychology ROS              Physical Exam    Airway    Mallampati score: II  TM Distance: >3 FB  Neck ROM: full     Dental   No notable dental hx     Cardiovascular  Rhythm: regular, Rate: normal, Cardiovascular exam normal    Pulmonary  Pulmonary exam normal Breath sounds clear to auscultation,     Other Findings        Anesthesia Plan  ASA Score- 2     Anesthesia Type- IV sedation with anesthesia with ASA Monitors  Additional Monitors:   Airway Plan:         Plan Factors-    Induction- intravenous  Postoperative Plan-     Informed Consent- Anesthetic plan and risks discussed with patient  I personally reviewed this patient with the CRNA  Discussed and agreed on the Anesthesia Plan with the CRNA           NPO and allergies verified  Patient last took Xarelto on 9/2/18  Plan:  IV sedation/MAC; GA as backup    Risks and benefits discussed with patient  Questions answered  Patient consented

## 2018-09-07 ENCOUNTER — ANESTHESIA (OUTPATIENT)
Dept: GASTROENTEROLOGY | Facility: HOSPITAL | Age: 68
End: 2018-09-07
Payer: MEDICARE

## 2018-09-07 ENCOUNTER — HOSPITAL ENCOUNTER (OUTPATIENT)
Facility: HOSPITAL | Age: 68
Setting detail: OUTPATIENT SURGERY
Discharge: HOME | End: 2018-09-07
Attending: COLON & RECTAL SURGERY | Admitting: COLON & RECTAL SURGERY
Payer: MEDICARE

## 2018-09-07 VITALS
HEIGHT: 64 IN | RESPIRATION RATE: 20 BRPM | OXYGEN SATURATION: 97 % | SYSTOLIC BLOOD PRESSURE: 101 MMHG | HEART RATE: 55 BPM | WEIGHT: 164 LBS | BODY MASS INDEX: 28 KG/M2 | DIASTOLIC BLOOD PRESSURE: 56 MMHG | TEMPERATURE: 96.9 F

## 2018-09-07 RX ORDER — LIDOCAINE HYDROCHLORIDE 10 MG/ML
INJECTION, SOLUTION INFILTRATION; PERINEURAL AS NEEDED
Status: DISCONTINUED | OUTPATIENT
Start: 2018-09-07 | End: 2018-09-07 | Stop reason: SURG

## 2018-09-07 RX ORDER — SODIUM CHLORIDE 9 MG/ML
100 INJECTION, SOLUTION INTRAVENOUS CONTINUOUS
Status: DISCONTINUED | OUTPATIENT
Start: 2018-09-07 | End: 2018-09-07 | Stop reason: HOSPADM

## 2018-09-07 RX ORDER — PROPOFOL 10 MG/ML
INJECTION, EMULSION INTRAVENOUS AS NEEDED
Status: DISCONTINUED | OUTPATIENT
Start: 2018-09-07 | End: 2018-09-07 | Stop reason: SURG

## 2018-09-07 RX ADMIN — PROPOFOL 50 MG: 10 INJECTION, EMULSION INTRAVENOUS at 07:57

## 2018-09-07 RX ADMIN — PROPOFOL 50 MG: 10 INJECTION, EMULSION INTRAVENOUS at 07:51

## 2018-09-07 RX ADMIN — PROPOFOL 50 MG: 10 INJECTION, EMULSION INTRAVENOUS at 08:05

## 2018-09-07 RX ADMIN — PROPOFOL 50 MG: 10 INJECTION, EMULSION INTRAVENOUS at 08:11

## 2018-09-07 RX ADMIN — PROPOFOL 50 MG: 10 INJECTION, EMULSION INTRAVENOUS at 07:54

## 2018-09-07 RX ADMIN — LIDOCAINE HYDROCHLORIDE 20 MG: 10 INJECTION, SOLUTION INFILTRATION; PERINEURAL at 07:51

## 2018-09-07 RX ADMIN — PROPOFOL 50 MG: 10 INJECTION, EMULSION INTRAVENOUS at 08:08

## 2018-09-07 RX ADMIN — PROPOFOL 50 MG: 10 INJECTION, EMULSION INTRAVENOUS at 08:02

## 2018-09-07 RX ADMIN — PROPOFOL 50 MG: 10 INJECTION, EMULSION INTRAVENOUS at 08:00

## 2018-09-07 RX ADMIN — SODIUM CHLORIDE 100 ML/HR: 0.9 INJECTION, SOLUTION INTRAVENOUS at 07:35

## 2018-09-07 RX ADMIN — PROPOFOL 50 MG: 10 INJECTION, EMULSION INTRAVENOUS at 07:52

## 2018-09-07 NOTE — H&P
History and Physical   Colon and Rectal Surgery   Salo Sainz 77 y o  female MRN: [id number]  Unit/Bed#: FRANC[facility] Encounter: [id number]  09/07/18   7:45 AM      No chief complaint on file  History of Present Illness   HPI:  Salo Sainz is a 77 y o  female who presents with h/o polyp        Historical Information   Past Medical History:   Diagnosis Date    Acid reflux 11/9/2015    Cataract     Cerebrovascular disease, ill-defined, acute     10/13/15    Degeneration of cervical intervertebral disc     Disease of thyroid gland     DVT (deep venous thrombosis) (HCC)     Meniere syndrome     Migraine     Osteoarthritis     Osteomalacia     Osteoporosis 3/15/2013    Patent foramen ovale     s/p percutaneous closure device    Polyp of sigmoid colon     Sciatica     of the right leg    Skin cancer     right leg (shin)    Stroke syndrome (HCC)      Past Surgical History:   Procedure Laterality Date    ADENOIDECTOMY      APPENDECTOMY      BREAST BIOPSY      CARDIAC SURGERY      CATARACT EXTRACTION      CERVICAL BIOPSY  W/ LOOP ELECTRODE EXCISION      Mild Dysplasia, 6/17/14    CHOLECYSTECTOMY      DILATION AND CURETTAGE OF UTERUS      HERNIA REPAIR      HYSTERECTOMY      KNEE SURGERY      OTHER SURGICAL HISTORY      Treatment of Pelvis, "extensive pelvic surgery)    PATENT FORAMEN OVALE CLOSURE  08/15/2006    Percutaneous    ROTATOR CUFF REPAIR Bilateral     TONSILLECTOMY      TOTAL ABDOMINAL HYSTERECTOMY      6/17/14    TUBAL LIGATION      UMBILICAL HERNIA REPAIR      6/17/14       Meds/Allergies     Prescriptions Prior to Admission   Medication    meclizine (ANTIVERT) 12 5 MG tablet    XARELTO 20 MG tablet    amoxicillin (AMOXIL) 500 mg capsule    ascorbic acid (CVS VITAMIN C) 1000 MG tablet    Calcium Carb-Cholecalciferol (CALCIUM 1000 + D PO)    clotrimazole-betamethasone (LOTRISONE) 1-0 05 % cream    desoximetasone (TOPICORT) 0 25 % cream    EPINEPHrine (EPIPEN 2-NAIN) 0 3 mg/0 3 mL SOAJ    Magnesium 250 MG TABS    metaxalone (SKELAXIN) 400 MG tablet    pravastatin (PRAVACHOL) 10 mg tablet    Vitamin D, Cholecalciferol, 1000 units TABS         Current Facility-Administered Medications:     sodium chloride 0 9 % infusion, 100 mL/hr, Intravenous, Continuous, Lesly Yanez MD, Last Rate: 100 mL/hr at 09/07/18 0735, 100 mL/hr at 09/07/18 0735    Allergies   Allergen Reactions    Docusate Abdominal Pain    Enoxaparin Tachycardia, Syncope and Hypertension    Lorazepam Other (See Comments)     Suicidal     Shellfish Allergy Abdominal Pain and Throat Swelling    Tramadol Angioedema     Other reaction(s): tight jaw    Codeine Sulfate Angioedema    Cortisone Vomiting and Headache    Cyclobenzaprine Tachycardia    Heparin Tachycardia, Syncope and Hypertension    Lactose Diarrhea    Other      Annotation - [id number]: Alcian  Adhesive tape    Tetracycline Angioedema    Vioxx [Rofecoxib] Angioedema    Celecoxib Rash    Chocolate Rash     **per pt not allergic, just gets migranes     Indomethacin Palpitations    Influenza A (H1n1) Monovalent Vaccine      **Per pt she used to have a problem, but no longer allergic to eggs, gets yearly     Neomycin Sulfate [Neomycin] Rash    Neosporin [Neomycin-Bacitracin Zn-Polymyx] Rash    Nitrofurantoin Rash    Propranolol Palpitations    Sulfamethoxazole-Trimethoprim Rash         Social History   History   Alcohol Use No     Comment: doesnt drink now as per patient she did in the past, per [employer]     History   Drug Use No     History   Smoking Status    Never Smoker   Smokeless Tobacco    Never Used     Comment: former smoker, per [employer]         Family History:   Family History   Problem Relation Age of Onset    Heart disease Mother     Thyroid disease Mother         disorder    Glaucoma Mother     Dementia Mother     Diabetes Mother     Arthritis Mother     Atrial fibrillation Mother     Hypertension Mother  Mitral valve prolapse Mother         disorder    Cancer Father     Asthma Father     Diabetes Father     Heart disease Father     Hypertension Father     Diabetes Brother     Coronary artery disease Family     Cerebral palsy Family     Hyperlipidemia Family     Osteoporosis Family     Other Sister         PFO         Objective     Current Vitals:   Blood Pressure: 125/59 (09/07/18 0722)  Pulse: 76 (09/07/18 0722)  Temperature: (!) 96 9 °F (36 1 °C) (09/07/18 0722)  Temp Source: Oral (09/07/18 0722)  Respirations: 18 (09/07/18 0722)  Height: 5' 4" (162 6 cm) (09/07/18 0722)  Weight - Scale: 74 4 kg (164 lb) (09/07/18 0722)  SpO2: 98 % (09/07/18 0722)  No intake or output data in the 24 hours ending 09/07/18 0745    Physical Exam:  General: No acute distress  Eyes: Normal   ENT: Normal   Neck: No JVD  Pulm: Normal in A&P  CV: NSR no murmur  Abdomen: Soft and normal on palpation, no mass, no tenderness, no guarding  Rectal: Normal sphincter tone, no perianal skin lesions  Extremities: Normal  Lymphatics: Normal        Lab Results: I have personally reviewed pertinent lab results  Imaging: I have personally reviewed pertinent reports  Patient was consented by myself for procedure as explained earlier with all the risks and benefits described  All questions answered  ASSESSMENT:  Diego Lee is a 77 y o  female who presents with h/o polyp        PLAN:  colonoscopy

## 2018-09-07 NOTE — OP NOTE
**** GI/ENDOSCOPY REPORT ****     PATIENT NAME: Veronika Milan ------ VISIT ID:     INTRODUCTION: Colonoscopy - A 77 female patient presents for an outpatient   Colonoscopy at [address] [place] [address]  PREVIOUS COLONOSCOPY:     INDICATIONS: Screening for personal history of polyps  CONSENT:  The benefits, risks, and alternatives to the procedure were   discussed and informed consent was obtained from the patient  PREPARATION: The patient was identified by myself both verbally and by   visual inspection of ID band  EKG, pulse, pulse oximetry and blood   pressure were monitored throughout the procedure  ASA Classification:   Class 1 - Patient has no organic, physiologic, biochemical, or psychiatric   disturbance  Airway Assessment Classification: Airway class 1 -   Visualization of the soft palate, fauces, uvula, and posterior pillars  MEDICATIONS: Anesthesia-check records     RECTAL EXAM: Normal sphincter tone  No external hemorrhoids  No internal   hemorrhoids  PROCEDURE:  The endoscope was passed without difficulty through the anus   under direct visualization and advanced to the cecum, confirmed by   photographs  The quality of the preparation was  The scope was withdrawn   and the mucosa was carefully examined  The views were good  The patient's   toleration of the procedure was good  Cecal Intubation Time: 10 minutes(s)   Cecal Withdrawal Time: 9 minutes(s)     FINDINGS:  There was no evidence of erythematous mucosae, polyps, or   tumors in the colon  There was evidence of severe diverticulosis in the   sigmoid colon  Otherwise, the colon appeared to be normal  Appendiceal   opening identified     COMPLICATIONS: There were no complications  IMPRESSIONS: No evidence of erythematous mucosae, polyps, and tumors in   the colon  Severe diverticulosis found in the sigmoid colon  RECOMMENDATIONS: Continue current medications   Call if you are having any   problems:   Lindy Salcedo [phone]  Start high fiber diet  Colonoscopy   recommended in 5 years  Call if any signs or symptoms of abdominal pain,   bleeding or diverticulitis  PATHOLOGY SPECIMENS:     PROCEDURE CODES:     ICD-9 Codes: V12 72 Personal history of colonic polyps 562 10   Diverticulosis of colon (without mention of hemorrhage)     ICD-10 Codes: Z86 010 Personal history of colonic polyps K57 Diverticular   disease of intestine     PERFORMED BY: CHEN Hairston Do  on 09/07/2018  Version 1, electronically signed by CHEN Hairston Do  on   09/07/2018 at 08:17

## 2018-09-07 NOTE — ANESTHESIA POSTPROCEDURE EVALUATION
Post-Op Assessment Note      CV Status:  Stable    Mental Status:  Alert    Hydration Status:  Stable    PONV Controlled:  None    Airway Patency:  Patent    Post Op Vitals Reviewed: Yes          Staff: CRNA           BP   100/55   Temp     Pulse  68   Resp   18   SpO2   95

## 2018-09-13 ENCOUNTER — TELEPHONE (OUTPATIENT)
Dept: CARDIOLOGY CLINIC | Facility: CLINIC | Age: 68
End: 2018-09-13

## 2018-09-13 NOTE — TELEPHONE ENCOUNTER
Montgomery Oms is having a Wide Excision of a skin cancer on 9/25  Ok to hold xarelto prior to procedure?

## 2018-11-05 ENCOUNTER — APPOINTMENT (OUTPATIENT)
Dept: LAB | Facility: CLINIC | Age: 68
End: 2018-11-05
Payer: MEDICARE

## 2018-11-05 DIAGNOSIS — E55.9 VITAMIN D DEFICIENCY: ICD-10-CM

## 2018-11-05 DIAGNOSIS — E21.3 HYPERPARATHYROIDISM (HCC): Chronic | ICD-10-CM

## 2018-11-05 DIAGNOSIS — E78.2 MIXED HYPERLIPIDEMIA: ICD-10-CM

## 2018-11-05 LAB
25(OH)D3 SERPL-MCNC: 32.1 NG/ML (ref 30–100)
ALBUMIN SERPL BCP-MCNC: 3.9 G/DL (ref 3.5–5)
ALP SERPL-CCNC: 78 U/L (ref 46–116)
ALT SERPL W P-5'-P-CCNC: 37 U/L (ref 12–78)
ANION GAP SERPL CALCULATED.3IONS-SCNC: 9 MMOL/L (ref 4–13)
AST SERPL W P-5'-P-CCNC: 21 U/L (ref 5–45)
BILIRUB SERPL-MCNC: 1 MG/DL (ref 0.2–1)
BUN SERPL-MCNC: 21 MG/DL (ref 5–25)
CALCIUM SERPL-MCNC: 9.5 MG/DL (ref 8.3–10.1)
CHLORIDE SERPL-SCNC: 108 MMOL/L (ref 100–108)
CHOLEST SERPL-MCNC: 132 MG/DL (ref 50–200)
CO2 SERPL-SCNC: 28 MMOL/L (ref 21–32)
CREAT SERPL-MCNC: 0.85 MG/DL (ref 0.6–1.3)
GFR SERPL CREATININE-BSD FRML MDRD: 70 ML/MIN/1.73SQ M
GLUCOSE P FAST SERPL-MCNC: 101 MG/DL (ref 65–99)
HDLC SERPL-MCNC: 42 MG/DL (ref 40–60)
LDLC SERPL DIRECT ASSAY-MCNC: 75 MG/DL (ref 0–100)
POTASSIUM SERPL-SCNC: 4.6 MMOL/L (ref 3.5–5.3)
PROT SERPL-MCNC: 6.9 G/DL (ref 6.4–8.2)
PTH-INTACT SERPL-MCNC: 106.6 PG/ML (ref 18.4–80.1)
SODIUM SERPL-SCNC: 145 MMOL/L (ref 136–145)
TRIGL SERPL-MCNC: 125 MG/DL

## 2018-11-05 PROCEDURE — 83721 ASSAY OF BLOOD LIPOPROTEIN: CPT

## 2018-11-05 PROCEDURE — 80061 LIPID PANEL: CPT

## 2018-11-05 PROCEDURE — 36415 COLL VENOUS BLD VENIPUNCTURE: CPT

## 2018-11-05 PROCEDURE — 80053 COMPREHEN METABOLIC PANEL: CPT

## 2018-11-05 PROCEDURE — 83970 ASSAY OF PARATHORMONE: CPT

## 2018-11-05 PROCEDURE — 82306 VITAMIN D 25 HYDROXY: CPT

## 2018-11-12 ENCOUNTER — OFFICE VISIT (OUTPATIENT)
Dept: INTERNAL MEDICINE CLINIC | Facility: CLINIC | Age: 68
End: 2018-11-12
Payer: MEDICARE

## 2018-11-12 VITALS
SYSTOLIC BLOOD PRESSURE: 118 MMHG | DIASTOLIC BLOOD PRESSURE: 78 MMHG | HEIGHT: 64 IN | TEMPERATURE: 98.1 F | OXYGEN SATURATION: 97 % | BODY MASS INDEX: 29.09 KG/M2 | WEIGHT: 170.4 LBS | HEART RATE: 60 BPM

## 2018-11-12 DIAGNOSIS — L08.9 SKIN INFECTION: ICD-10-CM

## 2018-11-12 DIAGNOSIS — E78.2 MIXED HYPERLIPIDEMIA: ICD-10-CM

## 2018-11-12 DIAGNOSIS — Z23 NEED FOR INFLUENZA VACCINATION: Primary | ICD-10-CM

## 2018-11-12 DIAGNOSIS — Z13.29 SCREENING FOR THYROID DISORDER: ICD-10-CM

## 2018-11-12 DIAGNOSIS — M62.838 MUSCLE SPASM: ICD-10-CM

## 2018-11-12 DIAGNOSIS — IMO0002 ANTIBIOTIC PROPHYLAXIS FOR DENTAL PROCEDURE INDICATED DUE TO PRIOR JOINT REPLACEMENT: ICD-10-CM

## 2018-11-12 DIAGNOSIS — R42 DIZZINESS: ICD-10-CM

## 2018-11-12 DIAGNOSIS — Z12.39 SCREENING FOR BREAST CANCER: ICD-10-CM

## 2018-11-12 DIAGNOSIS — I48.20 CHRONIC ATRIAL FIBRILLATION (HCC): Chronic | ICD-10-CM

## 2018-11-12 DIAGNOSIS — R93.9 DIAGNOSTIC IMAGING INCONCLUSIVE DUE TO EXCESS BODY FAT OF PATIENT: ICD-10-CM

## 2018-11-12 DIAGNOSIS — H81.02 MENIERE'S DISEASE OF LEFT EAR: ICD-10-CM

## 2018-11-12 DIAGNOSIS — E21.3 HYPERPARATHYROIDISM (HCC): Chronic | ICD-10-CM

## 2018-11-12 DIAGNOSIS — E55.9 VITAMIN D DEFICIENCY: Chronic | ICD-10-CM

## 2018-11-12 DIAGNOSIS — R73.03 PREDIABETES: Chronic | ICD-10-CM

## 2018-11-12 PROCEDURE — 90471 IMMUNIZATION ADMIN: CPT

## 2018-11-12 PROCEDURE — 99214 OFFICE O/P EST MOD 30 MIN: CPT | Performed by: INTERNAL MEDICINE

## 2018-11-12 PROCEDURE — 90682 RIV4 VACC RECOMBINANT DNA IM: CPT

## 2018-11-12 RX ORDER — HYDROCHLOROTHIAZIDE 12.5 MG/1
12.5 TABLET ORAL DAILY
Qty: 90 TABLET | Refills: 2 | Status: SHIPPED | OUTPATIENT
Start: 2018-11-12 | End: 2019-01-08 | Stop reason: SINTOL

## 2018-11-12 RX ORDER — AMOXICILLIN 500 MG/1
500 CAPSULE ORAL EVERY 8 HOURS SCHEDULED
Qty: 8 CAPSULE | Refills: 0 | Status: SHIPPED | OUTPATIENT
Start: 2018-11-12 | End: 2018-11-12

## 2018-11-12 RX ORDER — METAXALONE 400 MG/1
400 TABLET ORAL 3 TIMES DAILY
Qty: 270 TABLET | Refills: 3 | Status: SHIPPED | OUTPATIENT
Start: 2018-11-12 | End: 2018-11-12 | Stop reason: SDUPTHER

## 2018-11-12 RX ORDER — CLOTRIMAZOLE AND BETAMETHASONE DIPROPIONATE 10; .64 MG/G; MG/G
CREAM TOPICAL 2 TIMES DAILY
Qty: 30 G | Refills: 3 | Status: SHIPPED | OUTPATIENT
Start: 2018-11-12 | End: 2019-11-21 | Stop reason: SDUPTHER

## 2018-11-12 RX ORDER — MECLIZINE HCL 12.5 MG/1
TABLET ORAL
Qty: 90 TABLET | Refills: 1 | Status: SHIPPED | OUTPATIENT
Start: 2018-11-12 | End: 2019-11-05 | Stop reason: SDUPTHER

## 2018-11-12 RX ORDER — METAXALONE 400 MG/1
TABLET ORAL
Qty: 10 TABLET | Refills: 0 | Status: SHIPPED | OUTPATIENT
Start: 2018-11-12 | End: 2019-05-16 | Stop reason: SDUPTHER

## 2018-11-12 RX ORDER — DESOXIMETASONE 2.5 MG/G
CREAM TOPICAL 2 TIMES DAILY
Qty: 30 G | Refills: 3 | Status: SHIPPED | OUTPATIENT
Start: 2018-11-12 | End: 2021-11-30 | Stop reason: SDUPTHER

## 2018-11-12 RX ORDER — AMOXICILLIN 500 MG/1
CAPSULE ORAL
Qty: 8 CAPSULE | Refills: 0 | Status: SHIPPED | OUTPATIENT
Start: 2018-11-12 | End: 2018-11-16

## 2018-11-12 NOTE — PROGRESS NOTES
Assessment/Plan:    #Meniere's  -chronic history of  -currently on meclizine BID for relief  -will start on HCTZ to see if she can be weaned off meclizine due to anticholinergic effects, patient reports loss in visual acuity and dry eyes but denies urinary retention  -MRI brain by ENT reveals no acoustic neuroma, had epley performed in the past with relief however currently does not help    #Osteoarthritis of Jaw  -jaw pain initially thought to be related to prolia however MRI performed showed mild DJD  -she is currently deferring on prolia at this time    #Hyperparathyroidism  -chronic history of  -previous PTH was 96, currently 106 6  -saw endocrine in the past who said this may be related to inadequate intake calcium and vitamin D which she is supplementing with at this time  -parathryoid scans were without issues  -no further workup has been required by endocrine    #GERD  -asymptomatic  -takes PPI intermittently  -headaches with nexium and omeprazole    #HLD  -LDL stable on pravastatin    #Atrial Fibrillation  -sees cardiology annually  -asymptomatic and currently only on xarelto    #Patent Foramen Ovale  -surgical repaired  -currently on xarelto    #Muscle Spasms  -back spasms  -currently controlled on skelexin    #Vit D Deficiency  -Stable at this time on supplementation    #Health Maintenance   -return to care 6 months with routine labs  -colonoscopy 2018  -flu vaccine 2018  -mammogram referral given  -DEXA due in 2019    Addednum 1/8/19  Patient called to report that she feels the chest heaviness, leg cramps, fatigue only a few hours after taking the HCTZ  Reports that her BP runs in the 093-961 systolic range off HCTZ and elevated above systolic 296 whenever she takes the HCTZ  Will discontinue HCTZ at this time  Addendum 01/31/2019 patient evaluated by Cardiology for routine checkup and will return to care in approximately 1 year  No changes to her medicine regimen were made      Addendum 04/12/2019 patient completed mammogram screening revealing asymmetry in the right breast which she will undergo repeat mammography with possible ultrasound  Patient is aware of the findings    Addendum 5/6/19 patient completed routine labs revealing hypernatremia at 148, LDL 81 and HDL 38, Vitamin D 45,  6, TSH 1 895 a1c 5 4  Patient has a followup appointment on 5/16/19 and we will review results at that time  No problem-specific Assessment & Plan notes found for this encounter  Diagnoses and all orders for this visit:    Need for influenza vaccination  -     influenza vaccine, 1605-7204, quadrivalent, recombinant, PF, 0 5 mL, for patients 18 yr+ (FLUBLOK)  -     CBC and differential; Future  -     Vitamin D 25 hydroxy; Future  -     Lipid Panel with Direct LDL reflex; Future  -     Hemoglobin A1C; Future  -     TSH, 3rd generation with Free T4 reflex; Future  -     Comprehensive metabolic panel; Future    Dizziness  -     meclizine (ANTIVERT) 12 5 MG tablet; TAKE 1 TABLET TID PRN FOR DIZZINESS  -     CBC and differential; Future  -     Vitamin D 25 hydroxy; Future  -     Lipid Panel with Direct LDL reflex; Future  -     Hemoglobin A1C; Future  -     TSH, 3rd generation with Free T4 reflex; Future  -     Comprehensive metabolic panel; Future    Hyperparathyroidism ([facility] [address] )  -     PTH, intact; Future  -     CBC and differential; Future  -     Vitamin D 25 hydroxy; Future  -     Lipid Panel with Direct LDL reflex; Future  -     Hemoglobin A1C; Future  -     TSH, 3rd generation with Free T4 reflex; Future  -     Comprehensive metabolic panel; Future    Chronic atrial fibrillation (HCC)  -     CBC and differential; Future  -     Vitamin D 25 hydroxy; Future  -     Lipid Panel with Direct LDL reflex; Future  -     Hemoglobin A1C; Future  -     TSH, 3rd generation with Free T4 reflex; Future  -     Comprehensive metabolic panel;  Future    Prediabetes  -     CBC and differential; Future  -     Vitamin D 25 hydroxy; Future  -     Lipid Panel with Direct LDL reflex; Future  -     Hemoglobin A1C; Future  -     TSH, 3rd generation with Free T4 reflex; Future  -     Comprehensive metabolic panel; Future    Vitamin D deficiency  -     CBC and differential; Future  -     Vitamin D 25 hydroxy; Future  -     Lipid Panel with Direct LDL reflex; Future  -     Hemoglobin A1C; Future  -     TSH, 3rd generation with Free T4 reflex; Future  -     Comprehensive metabolic panel; Future    Mixed hyperlipidemia  -     CBC and differential; Future  -     Vitamin D 25 hydroxy; Future  -     Lipid Panel with Direct LDL reflex; Future  -     Hemoglobin A1C; Future  -     TSH, 3rd generation with Free T4 reflex; Future  -     Comprehensive metabolic panel; Future    Screening for breast cancer  -     Mammo diagnostic bilateral w cad; Future  -     CBC and differential; Future  -     Vitamin D 25 hydroxy; Future  -     Lipid Panel with Direct LDL reflex; Future  -     Hemoglobin A1C; Future  -     TSH, 3rd generation with Free T4 reflex; Future  -     Comprehensive metabolic panel; Future    Screening for thyroid disorder  -     CBC and differential; Future  -     Vitamin D 25 hydroxy; Future  -     Lipid Panel with Direct LDL reflex; Future  -     Hemoglobin A1C; Future  -     TSH, 3rd generation with Free T4 reflex; Future  -     Comprehensive metabolic panel; Future    Muscle spasm  -     metaxalone (SKELAXIN) 400 MG tablet; Take 1 tablet (400 mg total) by mouth 3 (three) times a day for 90 days  -     CBC and differential; Future  -     Vitamin D 25 hydroxy; Future  -     Lipid Panel with Direct LDL reflex; Future  -     Hemoglobin A1C; Future  -     TSH, 3rd generation with Free T4 reflex; Future  -     Comprehensive metabolic panel; Future    Antibiotic prophylaxis for dental procedure indicated due to prior joint replacement  -     amoxicillin (AMOXIL) 500 mg capsule;  Take 1 capsule (500 mg total) by mouth every 8 (eight) hours for 8 doses  - CBC and differential; Future  -     Vitamin D 25 hydroxy; Future  -     Lipid Panel with Direct LDL reflex; Future  -     Hemoglobin A1C; Future  -     TSH, 3rd generation with Free T4 reflex; Future  -     Comprehensive metabolic panel; Future    Skin infection  -     desoximetasone (TOPICORT) 0 25 % cream; Apply topically 2 (two) times a day for 30 days  -     clotrimazole-betamethasone (LOTRISONE) 1-0 05 % cream; Apply topically 2 (two) times a day for 30 days  -     CBC and differential; Future  -     Vitamin D 25 hydroxy; Future  -     Lipid Panel with Direct LDL reflex; Future  -     Hemoglobin A1C; Future  -     TSH, 3rd generation with Free T4 reflex; Future  -     Comprehensive metabolic panel; Future    Meniere's disease of left ear  -     hydrochlorothiazide (HYDRODIURIL) 12 5 mg tablet; Take 1 tablet (12 5 mg total) by mouth daily for 90 days  -     CBC and differential; Future  -     Vitamin D 25 hydroxy; Future  -     Lipid Panel with Direct LDL reflex; Future  -     Hemoglobin A1C; Future  -     TSH, 3rd generation with Free T4 reflex; Future  -     Comprehensive metabolic panel; Future    Diagnostic imaging inconclusive due to excess body fat of patient   -     Mammo diagnostic bilateral w cad; Future  -     CBC and differential; Future  -     Vitamin D 25 hydroxy; Future  -     Lipid Panel with Direct LDL reflex; Future  -     Hemoglobin A1C; Future  -     TSH, 3rd generation with Free T4 reflex; Future  -     Comprehensive metabolic panel;  Future            Current Outpatient Prescriptions:     amoxicillin (AMOXIL) 500 mg capsule, Take 1 capsule (500 mg total) by mouth every 8 (eight) hours for 8 doses, Disp: 8 capsule, Rfl: 0    ascorbic acid (CVS VITAMIN C) 1000 MG tablet, Take 1 tablet by mouth daily, Disp: , Rfl:     Calcium Carb-Cholecalciferol (CALCIUM 1000 + D PO), Take by mouth, Disp: , Rfl:     clotrimazole-betamethasone (LOTRISONE) 1-0 05 % cream, Apply topically 2 (two) times a day for 30 days, Disp: 30 g, Rfl: 3    desoximetasone (TOPICORT) 0 25 % cream, Apply topically 2 (two) times a day for 30 days, Disp: 30 g, Rfl: 3    EPINEPHrine (EPIPEN 2-NAIN) 0 3 mg/0 3 mL SOAJ, Inject 0 3 mL as directed, Disp: , Rfl:     hydrochlorothiazide (HYDRODIURIL) 12 5 mg tablet, Take 1 tablet (12 5 mg total) by mouth daily for 90 days, Disp: 90 tablet, Rfl: 2    Magnesium 250 MG TABS, Take 1 tablet by mouth daily, Disp: , Rfl:     meclizine (ANTIVERT) 12 5 MG tablet, TAKE 1 TABLET TID PRN FOR DIZZINESS, Disp: 90 tablet, Rfl: 1    metaxalone (SKELAXIN) 400 MG tablet, Take 1 tablet (400 mg total) by mouth 3 (three) times a day for 90 days, Disp: 270 tablet, Rfl: 3    pravastatin (PRAVACHOL) 10 mg tablet, Take 5 times weekly, Disp: , Rfl: 3    Vitamin D, Cholecalciferol, 1000 units TABS, Take by mouth 2 (two) times a day, Disp: , Rfl:     XARELTO 20 MG tablet, TAKE 1 TABLET BY MOUTH DAILY, Disp: 90 tablet, Rfl: 0    Subjective:      Patient ID: Trixie Moon is a 77 y o  female  HPI     Presents for annual checkup  Reports that she had 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 tibia removed by dermatology without issues  Denies any recent hospitalizations  States that she has a history of menieres disease and is taking meclizine BID  Informed her the risks regarding anticholingeric effects  States that she would be interested in diuretics to reduce her use of meclizine  We will start on low dose HCTZ today  Patient also ha sa history of jaw pain on prolia however MRI was negative for osteonecrosis  She has remained off prolia and current DEXA reveals osteopenia  She is due for repeat DEXA in June 2019  PTH level elevated at 106 6 up from 96  Patient saw endocrine in the past and underwent various tests and screening  She has been recommended to continue with calcium and vitamin D supplementation  Patient also has a history of afib and is currently in sinus with xarelto anticoagulation   HLD is stable and she will continue with pravastatin  She received her flu vaccine today and will followup in 6 months with routine labs  Referral for mammogram was given to patient  The following portions of the patient's history were reviewed and updated as appropriate: allergies, current medications, past family history, past medical history, past social history, past surgical history and problem list     Review of Systems   Constitutional: Negative for activity change, appetite change, chills, fatigue and fever  HENT: Positive for congestion, ear pain and tinnitus  Negative for facial swelling, hearing loss, sore throat and trouble swallowing  Eyes: Positive for redness  Negative for photophobia and visual disturbance  Respiratory: Negative for cough, shortness of breath and wheezing  Cardiovascular: Negative for chest pain and leg swelling  Gastrointestinal: Negative for abdominal distention, abdominal pain, blood in stool, nausea and vomiting  Genitourinary: Negative for difficulty urinating, dysuria and pelvic pain  Musculoskeletal: Negative for arthralgias and joint swelling  Skin: Positive for rash (diffuse seborrheic keratosis)  Negative for wound  Neurological: Negative for dizziness, tremors, light-headedness and headaches  Objective:      /78 (BP Location: Left arm, Patient Position: Sitting, Cuff Size: Adult)   Pulse 60   Temp 98 1 °F (36 7 °C) (Tympanic)   Ht 5' 4" (1 626 m)   Wt 77 3 kg (170 lb 6 4 oz)   SpO2 97%   BMI 29 25 kg/m²          Physical Exam   Constitutional: She is oriented to person, place, and time  She appears well-developed and well-nourished  HENT:   Head: Normocephalic and atraumatic  Right Ear: External ear normal    Left Ear: External ear normal    Nose: Nose normal    Mouth/Throat: Oropharynx is clear and moist    Eyes: Pupils are equal, round, and reactive to light  Conjunctivae and EOM are normal    Neck: Normal range of motion  Neck supple   No JVD present  No thyromegaly present  Cardiovascular: Normal rate, regular rhythm and intact distal pulses  Exam reveals gallop (PFO in place)  No murmur heard  Pulmonary/Chest: Effort normal and breath sounds normal  No stridor  No respiratory distress  She has no wheezes  Abdominal: Soft  Bowel sounds are normal  She exhibits no distension  There is no tenderness  There is no rebound and no guarding  Musculoskeletal: Normal range of motion  She exhibits no edema or tenderness  Lymphadenopathy:     She has no cervical adenopathy  Neurological: She is alert and oriented to person, place, and time  She has normal reflexes  Skin: Skin is warm  Rash (seborrheic keratosis on skin noted) noted  No erythema  Vitals reviewed

## 2018-11-12 NOTE — PROGRESS NOTES
Assessment and Plan:    Problem List Items Addressed This Visit     None        Health Maintenance Due   Topic Date Due    DTaP,Tdap,and Td Vaccines (1 - Tdap) 08[birth year]/1969    Fall Risk  08/18/2013    Urinary Incontinence Screening  08/18/2013    INFLUENZA VACCINE  07/01/2018         HPI:  Dale Altman is a 77 y o  female here for her Subsequent Wellness Visit      Patient Active Problem List   Diagnosis    Atrial fibrillation (HCC)    Hyperlipidemia    Palpitations    Patent foramen ovale    Acid reflux    Allergy    Arthritis    Headache    Hyperparathyroidism ([address] )    Insomnia    Joint pain, knee    Left knee pain    Osteoporosis    Prediabetes    Primary osteoarthritis of left knee    Vitamin D deficiency    Primary osteoarthritis of right knee    Primary osteoarthritis of left hip     Past Medical History:   Diagnosis Date    Acid reflux 11/9/2015    Cataract     Cerebrovascular disease, ill-defined, acute     10/13/15    Degeneration of cervical intervertebral disc     Disease of thyroid gland     [facility])     Meniere syndrome     Migraine     Osteoarthritis     Osteomalacia     Osteoporosis 3/15/2013    Patent foramen ovale     s/p percutaneous closure device    Polyp of sigmoid colon     Sciatica     of the right leg    Skin cancer     right leg (shin)    Stroke syndrome (HCC)      Past Surgical History:   Procedure Laterality Date    ADENOIDECTOMY      APPENDECTOMY      BREAST BIOPSY      CARDIAC SURGERY      CATARACT EXTRACTION      CERVICAL BIOPSY  W/ LOOP ELECTRODE EXCISION      Mild Dysplasia, 6/17/14    CHOLECYSTECTOMY      DILATION AND CURETTAGE OF UTERUS      HERNIA REPAIR      HYSTERECTOMY      KNEE SURGERY      OTHER SURGICAL HISTORY      Treatment of Pelvis, "extensive pelvic surgery)    PATENT FORAMEN OVALE CLOSURE  08/15/2006    Percutaneous    MN COLONOSCOPY FLX DX W/COLLJ SPEC WHEN PFRMD N/A 9/7/2018    Procedure: COLONOSCOPY;  Surgeon: Kathy Weber MD;  Location: [facility];   Service: Colorectal    ROTATOR CUFF REPAIR Bilateral     TONSILLECTOMY      TOTAL ABDOMINAL HYSTERECTOMY      6/17/14    TUBAL LIGATION      UMBILICAL HERNIA REPAIR      6/17/14     Family History   Problem Relation Age of Onset    Heart disease Mother     Thyroid disease Mother         disorder    Glaucoma Mother     Dementia Mother     Diabetes Mother     Arthritis Mother     Atrial fibrillation Mother     Hypertension Mother     Mitral valve prolapse Mother         disorder    Cancer Father     Asthma Father     Diabetes Father     Heart disease Father     Hypertension Father     Diabetes Brother     Coronary artery disease Family     Cerebral palsy Family     Hyperlipidemia Family     Osteoporosis Family     Other Sister         PFO     History   Smoking Status    Never Smoker   Smokeless Tobacco    Never Used     Comment: former smoker, per [employer]     History   Alcohol Use No     Comment: doesnt drink now as per patient she did in the past, per [employer]      History   Drug Use No       Current Outpatient Prescriptions   Medication Sig Dispense Refill    amoxicillin (AMOXIL) 500 mg capsule Take by mouth      ascorbic acid (CVS VITAMIN C) 1000 MG tablet Take 1 tablet by mouth daily      Calcium Carb-Cholecalciferol (CALCIUM 1000 + D PO) Take by mouth      clotrimazole-betamethasone (LOTRISONE) 1-0 05 % cream Apply topically Twice daily      desoximetasone (TOPICORT) 0 25 % cream REBEAC AA BID PRN  4    EPINEPHrine (EPIPEN 2-NAIN) 0 3 mg/0 3 mL SOAJ Inject 0 3 mL as directed      Magnesium 250 MG TABS Take 1 tablet by mouth daily      meclizine (ANTIVERT) 12 5 MG tablet TAKE 1 TABLET TID PRN FOR DIZZINESS 90 tablet 3    metaxalone (SKELAXIN) 400 MG tablet Take by mouth      pravastatin (PRAVACHOL) 10 mg tablet Take 5 times weekly  3    Vitamin D, Cholecalciferol, 1000 units TABS Take by mouth 2 (two) times a day      XARELTO 20 MG tablet TAKE 1 TABLET BY MOUTH DAILY 90 tablet 0     No current facility-administered medications for this visit  Allergies   Allergen Reactions    Docusate Abdominal Pain    Enoxaparin Tachycardia, Syncope and Hypertension    Lorazepam Other (See Comments)     Suicidal     Shellfish Allergy Abdominal Pain and Throat Swelling    Tramadol Angioedema     Other reaction(s): tight jaw    Codeine Sulfate Angioedema    Cortisone Vomiting and Headache    Cyclobenzaprine Tachycardia    Heparin Tachycardia, Syncope and Hypertension    Lactose Diarrhea    Other      Annotation - [id number]: Alcian  Adhesive tape    Tetracycline Angioedema    Vioxx [Rofecoxib] Angioedema    Celecoxib Rash    Chocolate Rash     **per pt not allergic, just gets migranes     Indomethacin Palpitations    Influenza A (H1n1) Monovalent Vaccine      **Per pt she used to have a problem, but no longer allergic to eggs, gets yearly     Neomycin Sulfate [Neomycin] Rash    Neosporin [Neomycin-Bacitracin Zn-Polymyx] Rash    Nitrofurantoin Rash    Propranolol Palpitations    Sulfamethoxazole-Trimethoprim Rash     Immunization History   Administered Date(s) Administered    Influenza 10/29/2014, 11/03/2015, 11/09/2016, 11/13/2017    Influenza Quadrivalent, 6-35 Months IM 11/09/2016, 11/13/2017    Influenza TIV (IM) 11/03/2015    Pneumococcal Conjugate 13-Valent 11/06/2014    Pneumococcal Polysaccharide PPV23 11/09/2016       Patient Care Team:  Jensen Mendes DO as PCP - General (Internal Medicine)  MD Joan Rodriges MD    Medicare Screening Tests and Risk Assessments:  Sabi Baptiste is here for her Subsequent Wellness visit  Last Medicare Wellness visit information reviewed, patient interviewed, no change since last AWV  Health Risk Assessment:  Patient rates overall health as good  Patient feels that their physical health rating is Same  Eyesight was rated as Same  Hearing was rated as Same  Patient feels that their emotional and mental health rating is Same  Pain experienced by patient in the last 7 days has been Some  Patient's pain rating has been 3/10  Patient states that she has experienced no weight loss or gain in last 6 months  Emotional/Mental Health:  Patient has been feeling nervous/anxious  PHQ-9 Depression Screening:    Frequency of the following problems over the past two weeks:      1  Little interest or pleasure in doing things: 0 - not at all      2  Feeling down, depressed, or hopeless: 0 - not at all  PHQ-2 Score: 0          Broken Bones/Falls: Fall Risk Assessment:    In the past year, patient has experienced: No history of falling in past year          Bladder/Bowel:  Patient has not leaked urine accidently in the last six months  Patient reports no loss of bowel control  Immunizations:  Patient has had a flu vaccination within the last year  Patient has received a pneumonia shot  Patient has received tetanus/diphtheria shot  Home Safety:  Patient does not have trouble with stairs inside or outside of their home  Patient currently reports that there are no safety hazards present in home, working smoke alarms, working carbon monoxide detectors  Preventative Screenings:   No breast cancer screening performed, colon cancer screen completed, cholesterol screen completed, glaucoma eye exam completed,     Nutrition:  Current diet: Regular with servings of the following:    Medications:  Patient is currently taking over-the-counter supplements  List of OTC medications includes: Vtiamin D  Patient is able to manage medications  Lifestyle Choices:  Patient reports no tobacco use  Patient has not smoked or used tobacco in the past   Patient reports no alcohol use  Patient drives a vehicle  Patient wears seat belt  Current level of exercise of physical activity described by patient as: walking daily 30 min          Activities of Daily Living:  Can get out of bed by his or her self, able to dress self, able to make own meals, able to do own shopping, able to bathe self, can do own laundry/housekeeping, can manage own money, pay bills and track expenses    Previous Hospitalizations:  No hospitalization or ED visit in past 12 months        Advanced Directives:  Patient has decided on a power of   Patient has spoken to designated power of   Patient has completed advanced directive  Preventative Screening/Counseling:      Cardiovascular:      General: Screening Current      Counseling: Healthy Diet and Healthy Weight          Diabetes:      General: Screening Current      Counseling: Healthy Diet, Healthy Weight and Improve Physical Activity          Colorectal Cancer:      General: Screening Current      Counseling: high fiber diet          Breast Cancer:      General: Risks and Benefits Discussed          Cervical Cancer:      General: Screening Not Indicated          Osteoporosis:      General: Screening Current          AAA:      General: Screening Not Indicated          Glaucoma:      General: Screening Current          HIV:      General: Screening Not Indicated          Hepatitis C:      General: Screening Not Indicated        Advanced Directives:   Patient has living will for healthcare, has durable POA for healthcare, patient has an advanced directive  Information on ACP and/or AD provided  5 wishes given  End of life assessment reviewed with patient  Provider agrees with end of life decisions        Immunizations:  Patient reviewed and up to date      Influenza: Influenza UTD This Year      Pneumococcal: Lifetime Vaccine Completed      Shingrix: Patient Declines      Hepatitis B (Low risk patients): Series Not Indicated      Zostavax: Patient Declines      TD: Td Vaccine UTD

## 2019-01-03 DIAGNOSIS — I48.0 PAROXYSMAL ATRIAL FIBRILLATION (HCC): ICD-10-CM

## 2019-01-08 ENCOUNTER — TELEPHONE (OUTPATIENT)
Dept: INTERNAL MEDICINE CLINIC | Facility: CLINIC | Age: 69
End: 2019-01-08

## 2019-01-08 NOTE — TELEPHONE ENCOUNTER
Symptoms discussed with patient and advised her to discontinue the HCTZ since she feels the chest heaviness, leg cramps, fatigue only a few hours after taking the HCTZ  Reports that her BP runs in the 437-004 systolic range off HCTZ and elevated above systolic 779 whenever she takes the HCTZ

## 2019-01-08 NOTE — TELEPHONE ENCOUNTER
-Pt called to report she is unable to tolerate water pill HTCZ   -Heavyness in chest with elevated BP   -Systoclic is over 220 but    -Terrible leg cramps, she did try some intake of potassium with banana but it is not helping     -She is off the Antivert at this point and takes   this as prn   -Fatigue   -No other symptons   -Please advise   Thanks   -Pt tele: [phone]

## 2019-01-31 ENCOUNTER — OFFICE VISIT (OUTPATIENT)
Dept: CARDIOLOGY CLINIC | Facility: CLINIC | Age: 69
End: 2019-01-31
Payer: MEDICARE

## 2019-01-31 VITALS
HEIGHT: 64 IN | BODY MASS INDEX: 29.62 KG/M2 | SYSTOLIC BLOOD PRESSURE: 116 MMHG | WEIGHT: 173.5 LBS | OXYGEN SATURATION: 97 % | DIASTOLIC BLOOD PRESSURE: 62 MMHG | HEART RATE: 64 BPM

## 2019-01-31 DIAGNOSIS — I48.91 ATRIAL FIBRILLATION, UNSPECIFIED TYPE (HCC): Primary | ICD-10-CM

## 2019-01-31 DIAGNOSIS — E78.2 MIXED HYPERLIPIDEMIA: ICD-10-CM

## 2019-01-31 DIAGNOSIS — R00.2 PALPITATIONS: ICD-10-CM

## 2019-01-31 DIAGNOSIS — Q21.1 PATENT FORAMEN OVALE: Chronic | ICD-10-CM

## 2019-01-31 PROCEDURE — 99214 OFFICE O/P EST MOD 30 MIN: CPT | Performed by: INTERNAL MEDICINE

## 2019-01-31 PROCEDURE — 93000 ELECTROCARDIOGRAM COMPLETE: CPT | Performed by: INTERNAL MEDICINE

## 2019-01-31 NOTE — PROGRESS NOTES
Cardiology Follow Up    Diane Cool  [birth year]  [id number]  [facility] [address] [place] [id number]    1  Atrial fibrillation, unspecified type ([address] )  POCT ECG   2  Patent foramen ovale     3  Mixed hyperlipidemia     4  Palpitations         Discussion/Summary:  Overall she has been doing well from a cardiac standpoint  She has had no recurrent palpitations  Overall she has been feeling quite well  She remains in sinus rhythm  Recent echocardiogram shows PFO closure device is well seated without any leak  Blood pressure and lipids are doing well  I have ordered no testing I will see her back in 8-9 months  Interval History:   Routin follow-up visit  She has a history of paroxysmal atrial fibrillation, PFO status post closure device on anticoagulation  Overall she has been doing well  She reports a few rare palpitations  Overall she is doing well  Denies any chest pain, shortness of breath, palpitations, lightheadedness, dizziness, or syncope  There has been no lower extremity edema, PND, orthopnea        Problem List     Atrial fibrillation ([facility] [address] )    Hyperlipidemia    Palpitations    Patent foramen ovale        Past Medical History:   Diagnosis Date    Acid reflux 11/9/2015    Cataract     Cerebrovascular disease, ill-defined, acute     10/13/15    Degeneration of cervical intervertebral disc     Disease of thyroid gland     DVT (deep venous thrombosis) (HCC)     Meniere syndrome     Migraine     Osteoarthritis     Osteomalacia     Osteoporosis 3/15/2013    Patent foramen ovale     s/p percutaneous closure device    Polyp of sigmoid colon     Sciatica     of the right leg    Skin cancer     right leg (shin)    Stroke syndrome      Social History     Social History    Marital status: /Civil Union     Spouse name: N/A    Number of children: N/A    Years of education: N/A     Occupational History    Medical Professional      Social History Main Topics    Smoking status: Never Smoker    Smokeless tobacco: Never Used      Comment: former smoker, per [employer]    Alcohol use No      Comment: doesnt drink now as per patient she did in the past, per Allscripts    Drug use: No    Sexual activity: Not on file     Other Topics Concern    Not on file     Social History Narrative    Caffeine use, active    Daily coffee consumption, 1 cups/day    Job hazardous    Job physical requirements heavy lifting    Work related stress          Family History   Problem Relation Age of Onset    Heart disease Mother     Thyroid disease Mother         disorder    Glaucoma Mother     Dementia Mother     Diabetes Mother     Arthritis Mother     Atrial fibrillation Mother     Hypertension Mother     Mitral valve prolapse Mother         disorder    Cancer Father     Asthma Father     Diabetes Father     Heart disease Father     Hypertension Father     Diabetes Brother     Coronary artery disease Family     Cerebral palsy Family     Hyperlipidemia Family     Osteoporosis Family     Other Sister         PFO     Past Surgical History:   Procedure Laterality Date    ADENOIDECTOMY      APPENDECTOMY      BREAST BIOPSY      CARDIAC SURGERY      CATARACT EXTRACTION      CERVICAL BIOPSY  W/ LOOP ELECTRODE EXCISION      Mild Dysplasia, 6/17/14    CHOLECYSTECTOMY      DILATION AND CURETTAGE OF UTERUS      HERNIA REPAIR      HYSTERECTOMY      KNEE SURGERY      OTHER SURGICAL HISTORY      Treatment of Pelvis, "extensive pelvic surgery)    PATENT FORAMEN OVALE CLOSURE  08/15/2006    Percutaneous    MD COLONOSCOPY FLX DX W/COLLJ SPEC WHEN PFRMD N/A 9/7/2018    Procedure: COLONOSCOPY;  Surgeon: Kathy Weber MD;  [facility];   Service: Colorectal    ROTATOR CUFF REPAIR Bilateral     TONSILLECTOMY      TOTAL ABDOMINAL HYSTERECTOMY      6/17/14    TUBAL LIGATION  UMBILICAL HERNIA REPAIR      6/17/14       Current Outpatient Prescriptions:     ascorbic acid (CVS VITAMIN C) 1000 MG tablet, Take 1 tablet by mouth daily, Disp: , Rfl:     Calcium Carb-Cholecalciferol (CALCIUM 1000 + D PO), Take by mouth, Disp: , Rfl:     clotrimazole-betamethasone (LOTRISONE) 1-0 05 % cream, Apply topically 2 (two) times a day for 30 days, Disp: 30 g, Rfl: 3    EPINEPHrine (EPIPEN 2-NAIN) 0 3 mg/0 3 mL SOAJ, Inject 0 3 mL as directed, Disp: , Rfl:     Magnesium 250 MG TABS, Take 1 tablet by mouth daily, Disp: , Rfl:     meclizine (ANTIVERT) 12 5 MG tablet, TAKE 1 TABLET TID PRN FOR DIZZINESS, Disp: 90 tablet, Rfl: 1    metaxalone (SKELAXIN) 400 MG tablet, Take 1 tablet daily as needed for back pain, Disp: 10 tablet, Rfl: 0    pravastatin (PRAVACHOL) 10 mg tablet, daily, Disp: , Rfl: 3    rivaroxaban (XARELTO) 20 mg tablet, Take 1 tablet (20 mg total) by mouth daily, Disp: 90 tablet, Rfl: 2    Vitamin D, Cholecalciferol, 1000 units TABS, Take by mouth 2 (two) times a day, Disp: , Rfl:     desoximetasone (TOPICORT) 0 25 % cream, Apply topically 2 (two) times a day for 30 days, Disp: 30 g, Rfl: 3  Allergies   Allergen Reactions    Docusate Abdominal Pain    Enoxaparin Tachycardia, Syncope and Hypertension    Lorazepam Other (See Comments)     Suicidal     Shellfish Allergy Abdominal Pain and Throat Swelling    Tramadol Angioedema     Other reaction(s): tight jaw    Codeine Sulfate Angioedema    Cortisone Vomiting and Headache    Cyclobenzaprine Tachycardia    Heparin Tachycardia, Syncope and Hypertension    Lactose Diarrhea    Other      Annotation - [id number]: Alcian  Adhesive tape    Tetracycline Angioedema    Vioxx [Rofecoxib] Angioedema    Celecoxib Rash    Chocolate Rash     **per pt not allergic, just gets migranes     Indomethacin Palpitations    Influenza A (H1n1) Monovalent Vaccine      **Per pt she used to have a problem, but no longer allergic to eggs, gets yearly     Neomycin Sulfate [Neomycin] Rash    Neosporin [Neomycin-Bacitracin Zn-Polymyx] Rash    Nitrofurantoin Rash    Propranolol Palpitations    Sulfamethoxazole-Trimethoprim Rash       Labs:     Chemistry        Component Value Date/Time     10/07/2015 0848    K 4 6 11/05/2018 0902    K 4 6 10/07/2015 0848     11/05/2018 0902     (H) 10/07/2015 0848    CO2 28 11/05/2018 0902    CO2 28 10/07/2015 0848    BUN 21 11/05/2018 0902    BUN 23 10/07/2015 0848    CREATININE 0 85 11/05/2018 0902    CREATININE 0 75 10/07/2015 0848        Component Value Date/Time    CALCIUM 9 5 11/05/2018 0902    CALCIUM 9 4 10/07/2015 0848    ALKPHOS 78 11/05/2018 0902    ALKPHOS 41 (L) 10/07/2015 0848    AST 21 11/05/2018 0902    AST 13 10/07/2015 0848    ALT 37 11/05/2018 0902    ALT 29 10/07/2015 0848    BILITOT 0 70 10/07/2015 0848            Lab Results   Component Value Date    CHOL 129 09/04/2014     Lab Results   Component Value Date    HDL 42 11/05/2018    HDL 48 05/07/2018    HDL 52 11/01/2017     Lab Results   Component Value Date    LDLCALC 69 09/04/2014     Lab Results   Component Value Date    TRIG 125 11/05/2018    TRIG 73 05/07/2018    TRIG 80 11/01/2017     No results found for: CHOLHDL    Imaging: Mammo Screening Bilateral W Cad    Result Date: 4/6/2018  Narrative: Patient History: Patient is postmenopausal and has history of other cancer  Family history of premenopausal colorectal cancer at age 36 in maternal grandmother, breast cancer at age 80 and endometrial cancer at age 48 or over in paternal aunt, endometrial cancer at age 48 or over in paternal aunt, breast cancer at age 78 and endometrial cancer at age 61 in paternal cousin  Benign excisional biopsy of the left breast, 2000  Took hormonal contraceptives for 1 year  Took estrogen for 14 years  Patient is a former smoker, and smoked for 1 year  Patient's BMI is 29 2  Reason for exam: screening, asymptomatic   Mammo Screening Bilateral W CAD: April 6, 2018 - Check In #: [de-identified] Bilateral CC and MLO view(s) were taken  Technologist: RT Teagan(R)(M) Prior study comparison: March 17, 2017, mammo screening bilateral W CAD performed at [address] [place] [address]  March 11, 2016, mammo screening bilateral W CAD performed at [address] [place] [address]  January 7, 2015, digital bilateral screening mammogram performed at [address] [place] [address]  January 30, 2013, digital bilateral screening mammogram performed at [address] [place] [address]  January 26, 2012, right breast unilateral diagnostic mammogram, performed at [address] [place] [address]  January 19, 2012, digital bilateral screening mammogram performed at [address] [place] [address]  There are scattered fibroglandular densities  No dominant soft tissue mass, architectural distortion or suspicious calcifications are noted in either breast   The skin and nipple contours are within normal limits  IMPRESSION: No evidence of malignancy  No significant changes when compared with prior studies  ACR BI-RADS® Assessments: BiRad:1 - Negative Recommendation: Routine screening mammogram of both breasts in 1 year  Analyzed by CAD The patient is scheduled in a reminder system for screening mammography  8-10% of cancers will be missed on mammography  Management of a palpable abnormality must be based on clinical grounds  Patients will be notified of their results via letter from our facility  Accredited by [employer] of [employer] and FDA  Transcription Location: [facility] Signing Station: [id number] Risk Value(s): Tyrer-Cuzick 10 Year: 3 700%, Tyrer-Cuzick Lifetime: 6 300%, Myriad Table: 1 5%, DMITRY 5 Year: 2 0%, NCI Lifetime: 6 0%, MRS : Based on personal and/or family history, consideration of hereditary risk assessment may be warranted  ECG:  Normal sinus rhythm unremarkable tracing      Review of Systems   Constitution: Negative  HENT: Negative      Eyes: Negative  Cardiovascular: Negative  Negative for chest pain, dyspnea on exertion, leg swelling, palpitations and syncope  Respiratory: Negative  Endocrine: Negative  Hematologic/Lymphatic: Negative  Skin: Negative  Musculoskeletal: Negative  Gastrointestinal: Negative  Genitourinary: Negative  Neurological: Negative  Psychiatric/Behavioral: Negative  Vitals:    01/31/19 1408   BP: 116/62   Pulse: 64   SpO2: 97%     Vitals:    01/31/19 1408   Weight: 78 7 kg (173 lb 8 oz)     Height: 5' 4" (162 6 cm)   Body mass index is 29 78 kg/m²  Physical Exam:  Vital signs reviewed  General appearance:  Appears stated age, alert, well appearing and in no distress  HEENT:  PERRLA, EOMI, no scleral icterus, no conjunctival pallor  NECK:  Supple, No elevated JVP, no thyromegaly, no carotid bruits  HEART:  Regular rate and rhythm positive S1/S2 no murmurs rubs or gallops no S3 no S4 no lower extremity edema, PND, orthopnea    LUNGS:  Clear to auscultation bilaterally, no wheezes rales or rhonchi  ABDOMEN:  Soft, non-tender, positive bowel sounds, no rebound or guarding, no organomegaly   EXTREMITIES:  No edema, normal range of motion  VASCULAR:  Normal pedal pulses, good pulse volume   SKIN: No lesions or rashes on exposed skin  NEURO:  CN II-XII intact, no focal deficits

## 2019-04-11 ENCOUNTER — HOSPITAL ENCOUNTER (OUTPATIENT)
Dept: RADIOLOGY | Age: 69
Discharge: HOME | End: 2019-04-11
Payer: MEDICARE

## 2019-04-11 VITALS — HEIGHT: 64 IN | WEIGHT: 166 LBS | BODY MASS INDEX: 28.34 KG/M2

## 2019-04-11 DIAGNOSIS — Z12.31 ENCOUNTER FOR SCREENING MAMMOGRAM FOR MALIGNANT NEOPLASM OF BREAST: ICD-10-CM

## 2019-04-11 PROCEDURE — 77067 SCR MAMMO BI INCL CAD: CPT

## 2019-04-12 ENCOUNTER — OFFICE VISIT (OUTPATIENT)
Dept: OBGYN CLINIC | Facility: MEDICAL CENTER | Age: 69
End: 2019-04-12
Payer: MEDICARE

## 2019-04-12 ENCOUNTER — APPOINTMENT (OUTPATIENT)
Dept: RADIOLOGY | Facility: MEDICAL CENTER | Age: 69
End: 2019-04-12
Payer: MEDICARE

## 2019-04-12 VITALS
HEART RATE: 67 BPM | RESPIRATION RATE: 18 BRPM | DIASTOLIC BLOOD PRESSURE: 73 MMHG | BODY MASS INDEX: 29.19 KG/M2 | HEIGHT: 64 IN | SYSTOLIC BLOOD PRESSURE: 108 MMHG | WEIGHT: 171 LBS

## 2019-04-12 DIAGNOSIS — M25.561 RIGHT KNEE PAIN, UNSPECIFIED CHRONICITY: ICD-10-CM

## 2019-04-12 DIAGNOSIS — M25.561 RIGHT KNEE PAIN, UNSPECIFIED CHRONICITY: Primary | ICD-10-CM

## 2019-04-12 DIAGNOSIS — M17.11 PRIMARY OSTEOARTHRITIS OF RIGHT KNEE: ICD-10-CM

## 2019-04-12 PROCEDURE — 73560 X-RAY EXAM OF KNEE 1 OR 2: CPT

## 2019-04-12 PROCEDURE — 20610 DRAIN/INJ JOINT/BURSA W/O US: CPT | Performed by: ORTHOPAEDIC SURGERY

## 2019-04-12 PROCEDURE — 99213 OFFICE O/P EST LOW 20 MIN: CPT | Performed by: ORTHOPAEDIC SURGERY

## 2019-04-17 ENCOUNTER — HOSPITAL ENCOUNTER (OUTPATIENT)
Dept: MAMMOGRAPHY | Facility: CLINIC | Age: 69
Discharge: HOME | End: 2019-04-17
Payer: MEDICARE

## 2019-04-17 ENCOUNTER — HOSPITAL ENCOUNTER (OUTPATIENT)
Dept: ULTRASOUND IMAGING | Facility: CLINIC | Age: 69
Discharge: HOME | End: 2019-04-17
Payer: MEDICARE

## 2019-04-17 DIAGNOSIS — R92.8 ABNORMAL MAMMOGRAM: ICD-10-CM

## 2019-04-17 PROCEDURE — 76642 ULTRASOUND BREAST LIMITED: CPT

## 2019-04-17 PROCEDURE — G0279 TOMOSYNTHESIS, MAMMO: HCPCS

## 2019-04-17 PROCEDURE — 77065 DX MAMMO INCL CAD UNI: CPT

## 2019-05-06 ENCOUNTER — APPOINTMENT (OUTPATIENT)
Dept: LAB | Facility: CLINIC | Age: 69
End: 2019-05-06
Payer: MEDICARE

## 2019-05-06 DIAGNOSIS — Z23 NEED FOR INFLUENZA VACCINATION: ICD-10-CM

## 2019-05-06 DIAGNOSIS — R42 DIZZINESS: ICD-10-CM

## 2019-05-06 DIAGNOSIS — Z13.29 SCREENING FOR THYROID DISORDER: ICD-10-CM

## 2019-05-06 DIAGNOSIS — R73.03 PREDIABETES: Chronic | ICD-10-CM

## 2019-05-06 DIAGNOSIS — Z12.39 SCREENING FOR BREAST CANCER: ICD-10-CM

## 2019-05-06 DIAGNOSIS — E21.3 HYPERPARATHYROIDISM (HCC): Chronic | ICD-10-CM

## 2019-05-06 DIAGNOSIS — E78.2 MIXED HYPERLIPIDEMIA: ICD-10-CM

## 2019-05-06 DIAGNOSIS — IMO0002 ANTIBIOTIC PROPHYLAXIS FOR DENTAL PROCEDURE INDICATED DUE TO PRIOR JOINT REPLACEMENT: ICD-10-CM

## 2019-05-06 DIAGNOSIS — H81.02 MENIERE'S DISEASE OF LEFT EAR: ICD-10-CM

## 2019-05-06 DIAGNOSIS — L08.9 SKIN INFECTION: ICD-10-CM

## 2019-05-06 DIAGNOSIS — M62.838 MUSCLE SPASM: ICD-10-CM

## 2019-05-06 DIAGNOSIS — I48.20 CHRONIC ATRIAL FIBRILLATION (HCC): Chronic | ICD-10-CM

## 2019-05-06 DIAGNOSIS — R93.9 DIAGNOSTIC IMAGING INCONCLUSIVE DUE TO EXCESS BODY FAT OF PATIENT: ICD-10-CM

## 2019-05-06 DIAGNOSIS — E55.9 VITAMIN D DEFICIENCY: Chronic | ICD-10-CM

## 2019-05-06 LAB
25(OH)D3 SERPL-MCNC: 45 NG/ML (ref 30–100)
ALBUMIN SERPL BCP-MCNC: 3.7 G/DL (ref 3.5–5)
ALP SERPL-CCNC: 70 U/L (ref 46–116)
ALT SERPL W P-5'-P-CCNC: 31 U/L (ref 12–78)
ANION GAP SERPL CALCULATED.3IONS-SCNC: 10 MMOL/L (ref 4–13)
AST SERPL W P-5'-P-CCNC: 18 U/L (ref 5–45)
BASOPHILS # BLD AUTO: 0.05 THOUSANDS/ΜL (ref 0–0.1)
BASOPHILS NFR BLD AUTO: 1 % (ref 0–1)
BILIRUB SERPL-MCNC: 0.8 MG/DL (ref 0.2–1)
BUN SERPL-MCNC: 21 MG/DL (ref 5–25)
CALCIUM SERPL-MCNC: 9.5 MG/DL (ref 8.3–10.1)
CHLORIDE SERPL-SCNC: 112 MMOL/L (ref 100–108)
CHOLEST SERPL-MCNC: 141 MG/DL (ref 50–200)
CO2 SERPL-SCNC: 26 MMOL/L (ref 21–32)
CREAT SERPL-MCNC: 0.89 MG/DL (ref 0.6–1.3)
EOSINOPHIL # BLD AUTO: 0.16 THOUSAND/ΜL (ref 0–0.61)
EOSINOPHIL NFR BLD AUTO: 3 % (ref 0–6)
ERYTHROCYTE [DISTWIDTH] IN BLOOD BY AUTOMATED COUNT: 13.8 % (ref 11.6–15.1)
EST. AVERAGE GLUCOSE BLD GHB EST-MCNC: 108 MG/DL
GFR SERPL CREATININE-BSD FRML MDRD: 66 ML/MIN/1.73SQ M
GLUCOSE P FAST SERPL-MCNC: 96 MG/DL (ref 65–99)
HBA1C MFR BLD: 5.4 % (ref 4.2–6.3)
HCT VFR BLD AUTO: 41.9 % (ref 34.8–46.1)
HDLC SERPL-MCNC: 38 MG/DL (ref 40–60)
HGB BLD-MCNC: 13.6 G/DL (ref 11.5–15.4)
IMM GRANULOCYTES # BLD AUTO: 0.01 THOUSAND/UL (ref 0–0.2)
IMM GRANULOCYTES NFR BLD AUTO: 0 % (ref 0–2)
LDLC SERPL CALC-MCNC: 81 MG/DL (ref 0–100)
LYMPHOCYTES # BLD AUTO: 1.92 THOUSANDS/ΜL (ref 0.6–4.47)
LYMPHOCYTES NFR BLD AUTO: 40 % (ref 14–44)
MCH RBC QN AUTO: 30.4 PG (ref 26.8–34.3)
MCHC RBC AUTO-ENTMCNC: 32.5 G/DL (ref 31.4–37.4)
MCV RBC AUTO: 94 FL (ref 82–98)
MONOCYTES # BLD AUTO: 0.39 THOUSAND/ΜL (ref 0.17–1.22)
MONOCYTES NFR BLD AUTO: 8 % (ref 4–12)
NEUTROPHILS # BLD AUTO: 2.25 THOUSANDS/ΜL (ref 1.85–7.62)
NEUTS SEG NFR BLD AUTO: 48 % (ref 43–75)
NRBC BLD AUTO-RTO: 0 /100 WBCS
PLATELET # BLD AUTO: 211 THOUSANDS/UL (ref 149–390)
PMV BLD AUTO: 10 FL (ref 8.9–12.7)
POTASSIUM SERPL-SCNC: 4.8 MMOL/L (ref 3.5–5.3)
PROT SERPL-MCNC: 6.8 G/DL (ref 6.4–8.2)
PTH-INTACT SERPL-MCNC: 103.6 PG/ML (ref 18.4–80.1)
RBC # BLD AUTO: 4.47 MILLION/UL (ref 3.81–5.12)
SODIUM SERPL-SCNC: 148 MMOL/L (ref 136–145)
TRIGL SERPL-MCNC: 111 MG/DL
TSH SERPL DL<=0.05 MIU/L-ACNC: 1.9 UIU/ML (ref 0.36–3.74)
WBC # BLD AUTO: 4.78 THOUSAND/UL (ref 4.31–10.16)

## 2019-05-06 PROCEDURE — 83036 HEMOGLOBIN GLYCOSYLATED A1C: CPT

## 2019-05-06 PROCEDURE — 80053 COMPREHEN METABOLIC PANEL: CPT

## 2019-05-06 PROCEDURE — 84443 ASSAY THYROID STIM HORMONE: CPT

## 2019-05-06 PROCEDURE — 85025 COMPLETE CBC W/AUTO DIFF WBC: CPT

## 2019-05-06 PROCEDURE — 82306 VITAMIN D 25 HYDROXY: CPT

## 2019-05-06 PROCEDURE — 80061 LIPID PANEL: CPT

## 2019-05-06 PROCEDURE — 83970 ASSAY OF PARATHORMONE: CPT

## 2019-05-06 PROCEDURE — 36415 COLL VENOUS BLD VENIPUNCTURE: CPT

## 2019-05-16 ENCOUNTER — OFFICE VISIT (OUTPATIENT)
Dept: INTERNAL MEDICINE CLINIC | Facility: CLINIC | Age: 69
End: 2019-05-16
Payer: MEDICARE

## 2019-05-16 VITALS
BODY MASS INDEX: 29.71 KG/M2 | RESPIRATION RATE: 15 BRPM | OXYGEN SATURATION: 96 % | SYSTOLIC BLOOD PRESSURE: 116 MMHG | DIASTOLIC BLOOD PRESSURE: 80 MMHG | WEIGHT: 174 LBS | HEART RATE: 69 BPM | HEIGHT: 64 IN

## 2019-05-16 DIAGNOSIS — E78.2 MIXED HYPERLIPIDEMIA: Primary | ICD-10-CM

## 2019-05-16 DIAGNOSIS — E55.9 VITAMIN D DEFICIENCY: Chronic | ICD-10-CM

## 2019-05-16 DIAGNOSIS — F32.9 REACTIVE DEPRESSION: ICD-10-CM

## 2019-05-16 DIAGNOSIS — Z00.01 ENCOUNTER FOR GENERAL ADULT MEDICAL EXAMINATION WITH ABNORMAL FINDINGS: ICD-10-CM

## 2019-05-16 DIAGNOSIS — M81.0 OSTEOPOROSIS WITHOUT CURRENT PATHOLOGICAL FRACTURE, UNSPECIFIED OSTEOPOROSIS TYPE: Chronic | ICD-10-CM

## 2019-05-16 DIAGNOSIS — I48.20 CHRONIC ATRIAL FIBRILLATION (HCC): Chronic | ICD-10-CM

## 2019-05-16 DIAGNOSIS — M62.838 MUSCLE SPASM: ICD-10-CM

## 2019-05-16 DIAGNOSIS — M25.50 ARTHRALGIA, UNSPECIFIED JOINT: ICD-10-CM

## 2019-05-16 DIAGNOSIS — T78.03XS ANAPHYLACTIC SHOCK DUE TO SEAFOOD, SEQUELA: ICD-10-CM

## 2019-05-16 DIAGNOSIS — E21.3 HYPERPARATHYROIDISM (HCC): Chronic | ICD-10-CM

## 2019-05-16 PROCEDURE — G0438 PPPS, INITIAL VISIT: HCPCS | Performed by: INTERNAL MEDICINE

## 2019-05-16 RX ORDER — SERTRALINE HYDROCHLORIDE 25 MG/1
25 TABLET, FILM COATED ORAL DAILY
Qty: 90 TABLET | Refills: 3 | Status: SHIPPED | OUTPATIENT
Start: 2019-05-16 | End: 2020-01-07 | Stop reason: SDUPTHER

## 2019-05-16 RX ORDER — METAXALONE 400 MG/1
TABLET ORAL
Qty: 10 TABLET | Refills: 0 | Status: SHIPPED | OUTPATIENT
Start: 2019-05-16 | End: 2019-11-21 | Stop reason: SDUPTHER

## 2019-05-16 RX ORDER — EPINEPHRINE 0.3 MG/.3ML
0.3 INJECTION SUBCUTANEOUS ONCE
Qty: 0.6 ML | Refills: 3 | Status: SHIPPED | OUTPATIENT
Start: 2019-05-16 | End: 2019-07-22

## 2019-05-23 DIAGNOSIS — E78.2 MIXED HYPERLIPIDEMIA: Primary | ICD-10-CM

## 2019-05-23 RX ORDER — PRAVASTATIN SODIUM 10 MG
10 TABLET ORAL DAILY
Qty: 90 TABLET | Refills: 3 | Status: SHIPPED | OUTPATIENT
Start: 2019-05-23 | End: 2020-09-21 | Stop reason: SDUPTHER

## 2019-06-20 ENCOUNTER — HOSPITAL ENCOUNTER (OUTPATIENT)
Dept: RADIOLOGY | Age: 69
Discharge: HOME | End: 2019-06-20
Payer: MEDICARE

## 2019-06-20 DIAGNOSIS — M81.0 OSTEOPOROSIS WITHOUT CURRENT PATHOLOGICAL FRACTURE, UNSPECIFIED OSTEOPOROSIS TYPE: ICD-10-CM

## 2019-06-20 PROCEDURE — 77080 DXA BONE DENSITY AXIAL: CPT

## 2019-07-22 RX ORDER — EPINEPHRINE 0.3 MG/.3ML
INJECTION SUBCUTANEOUS
Refills: 3 | COMMUNITY
Start: 2019-05-16 | End: 2022-01-03 | Stop reason: SDUPTHER

## 2019-07-26 ENCOUNTER — OFFICE VISIT (OUTPATIENT)
Dept: OBGYN CLINIC | Facility: MEDICAL CENTER | Age: 69
End: 2019-07-26
Payer: MEDICARE

## 2019-07-26 VITALS
SYSTOLIC BLOOD PRESSURE: 130 MMHG | DIASTOLIC BLOOD PRESSURE: 76 MMHG | WEIGHT: 174 LBS | HEIGHT: 64 IN | HEART RATE: 60 BPM | BODY MASS INDEX: 29.71 KG/M2

## 2019-07-26 DIAGNOSIS — M17.11 PRIMARY OSTEOARTHRITIS OF RIGHT KNEE: Primary | ICD-10-CM

## 2019-07-26 DIAGNOSIS — M25.571 PAIN, JOINT, ANKLE AND FOOT, RIGHT: ICD-10-CM

## 2019-07-26 PROCEDURE — 99213 OFFICE O/P EST LOW 20 MIN: CPT | Performed by: ORTHOPAEDIC SURGERY

## 2019-07-26 NOTE — PROGRESS NOTES
Assessment:  1  Primary osteoarthritis of right knee     2  Pain, joint, ankle and foot, right         Plan:  The patient is doing well  She can return any time for repeat Toradol injection if needed and should follow up as needed  To do next visit:  Return if symptoms worsen or fail to improve  The above stated was discussed in layman's terms and the patient expressed understanding  All questions were answered to the patient's satisfaction  Scribe Attestation    I,:   Carlos Garcia am acting as a scribe while in the presence of the attending physician :        I,:   Kacey Melgar MD personally performed the services described in this documentation    as scribed in my presence :              Subjective:   Deepali Lyn is a 77 y o  female who presents for follow up of right knee and ankle pain  She is s/p right knee Toradol injection 4/12/19 with lasting benefit  Today she has minimal right knee and ankle pain  She rates her pain at 2/10  She denies medications for this issue  She is s/p right knee menisectomy 5-6 years ago with Dr Snyder         Review of systems negative unless otherwise specified in HPI    Past Medical History:   Diagnosis Date    Acid reflux 11/9/2015    Cataract     Cerebrovascular disease, ill-defined, acute     10/13/15    Degeneration of cervical intervertebral disc     Disease of thyroid gland     DVT (deep venous thrombosis) (HCC)     Heart disease     Meniere syndrome     Migraine     Osteoarthritis     Osteomalacia     Osteoporosis 3/15/2013    Patent foramen ovale     s/p percutaneous closure device    Polyp of sigmoid colon     Sciatica     of the right leg    Skin cancer     right leg (shin)    Stroke syndrome        Past Surgical History:   Procedure Laterality Date    ADENOIDECTOMY      APPENDECTOMY      BREAST EXCISIONAL BIOPSY Left 2000    CARDIAC SURGERY      CATARACT EXTRACTION      CERVICAL BIOPSY  W/ LOOP ELECTRODE EXCISION Mild Dysplasia, 6/17/14    CHOLECYSTECTOMY      DILATION AND CURETTAGE OF UTERUS      HERNIA REPAIR      KNEE SURGERY      OTHER SURGICAL HISTORY      Treatment of Pelvis, "extensive pelvic surgery)    PATENT FORAMEN OVALE CLOSURE  08/15/2006    Percutaneous    OH COLONOSCOPY FLX DX W/COLLJ SPEC WHEN PFRMD N/A 9/7/2018    Procedure: COLONOSCOPY;  Surgeon: Manuel Wood MD;  [facility];   Service: Colorectal    ROTATOR CUFF REPAIR Bilateral     SHOULDER SURGERY      TONSILLECTOMY      TOTAL ABDOMINAL HYSTERECTOMY Bilateral 2008    age 57    TOTAL ABDOMINAL HYSTERECTOMY      6/17/14    TOTAL ABDOMINAL HYSTERECTOMY W/ BILATERAL SALPINGOOPHORECTOMY Bilateral 2008    age 59    TUBAL LIGATION      UMBILICAL HERNIA REPAIR      6/17/14       Family History   Problem Relation Age of Onset    Heart disease Mother     Thyroid disease Mother         disorder    Glaucoma Mother     Dementia Mother     Diabetes Mother     Arthritis Mother     Atrial fibrillation Mother     Hypertension Mother     Mitral valve prolapse Mother         disorder    Osteoporosis Mother     Cancer Father     Asthma Father     Heart disease Father     Hypertension Father     Coronary artery disease Family     Cerebral palsy Family     Hyperlipidemia Family     Osteoporosis Family     Other Sister         PFO    Colon cancer Maternal Grandmother 36    Breast cancer Paternal Aunt 80    Endometrial cancer Paternal Aunt     Endometrial cancer Paternal Aunt     Breast cancer Cousin 78    Endometrial cancer Cousin 61       Social History     Occupational History    Occupation: Medical Professional   Tobacco Use    Smoking status: Never Smoker    Smokeless tobacco: Never Used    Tobacco comment: former smoker, per [employer]scripts   Substance and Sexual Activity    Alcohol use: No     Comment: doesnt drink now as per patient she did in the past, per [employer]scripts    Drug use: No    Sexual activity: Not on file         Current Outpatient Medications:     ascorbic acid (CVS VITAMIN C) 1000 MG tablet, Take 1 tablet by mouth daily, Disp: , Rfl:     CALCIUM PO, Take by mouth, Disp: , Rfl:     clotrimazole-betamethasone (LOTRISONE) 1-0 05 % cream, Apply topically 2 (two) times a day for 30 days, Disp: 30 g, Rfl: 3    desoximetasone (TOPICORT) 0 25 % cream, Apply topically 2 (two) times a day for 30 days, Disp: 30 g, Rfl: 3    EPINEPHrine (EPIPEN) 0 3 mg/0 3 mL SOAJ, INJECT 0 3 ML INTO A MUSCLE ONCE FOR ONE DOSE, Disp: , Rfl: 3    Magnesium 250 MG TABS, Take 1 tablet by mouth daily, Disp: , Rfl:     meclizine (ANTIVERT) 12 5 MG tablet, TAKE 1 TABLET TID PRN FOR DIZZINESS, Disp: 90 tablet, Rfl: 1    metaxalone (SKELAXIN) 400 MG tablet, Take 1 tablet daily as needed for back pain, Disp: 10 tablet, Rfl: 0    pravastatin (PRAVACHOL) 10 mg tablet, Take 1 tablet (10 mg total) by mouth daily, Disp: 90 tablet, Rfl: 3    rivaroxaban (XARELTO) 20 mg tablet, Take 1 tablet (20 mg total) by mouth daily, Disp: 90 tablet, Rfl: 2    sertraline (ZOLOFT) 25 mg tablet, Take 1 tablet (25 mg total) by mouth daily, Disp: 90 tablet, Rfl: 3    Vitamin D, Cholecalciferol, 1000 units TABS, Take by mouth 2 (two) times a day, Disp: , Rfl:     Allergies   Allergen Reactions    Docusate Abdominal Pain    Enoxaparin Tachycardia, Syncope and Hypertension    Lorazepam Other (See Comments)     Suicidal     Shellfish Allergy Abdominal Pain and Throat Swelling    Tramadol Angioedema     Other reaction(s): tight jaw    Codeine Sulfate Angioedema    Cortisone Vomiting and Headache    Cyclobenzaprine Tachycardia    Heparin Tachycardia, Syncope and Hypertension    Lactose Diarrhea    Other      Annotation - [id number]: Alcian  Adhesive tape    Tetracycline Angioedema    Vioxx [Rofecoxib] Angioedema    Celecoxib Rash    Chocolate Headache     **per pt not allergic, just gets migranes     Indomethacin Palpitations    Influenza A (H1n1) Monovalent Vaccine      **Per pt she used to have a problem, but no longer allergic to eggs, gets yearly     Neomycin Sulfate [Neomycin] Rash    Neosporin [Neomycin-Bacitracin Zn-Polymyx] Rash    Nitrofurantoin Rash    Propranolol Palpitations    Sulfamethoxazole-Trimethoprim Rash            Vitals:    07/26/19 0948   BP: 130/76   Pulse: 60       Objective:  Physical exam  · General: Awake, Alert, Oriented  · Eyes: Pupils equal, round and reactive to light  · Heart: regular rate and rhythm  · Lungs: No audible wheezing  · Abdomen: soft                    Ortho Exam   Right knee:  No erythema or ecchymosis  No effusion or swelling  Normal strength  Good ROM with crepitus   Calf compartments soft and supple  Sensation intact  Toes are warm sensate and mobile    Diagnostics, reviewed and taken today if performed as documented:    None performed       Procedures, if performed today:    Procedures    None performed      Portions of the record may have been created with voice recognition software  Occasional wrong word or "sound a like" substitutions may have occurred due to the inherent limitations of voice recognition software  Read the chart carefully and recognize, using context, where substitutions have occurred

## 2019-09-06 ENCOUNTER — OFFICE VISIT (OUTPATIENT)
Dept: OBGYN CLINIC | Facility: MEDICAL CENTER | Age: 69
End: 2019-09-06
Payer: MEDICARE

## 2019-09-06 VITALS
SYSTOLIC BLOOD PRESSURE: 122 MMHG | DIASTOLIC BLOOD PRESSURE: 75 MMHG | HEIGHT: 64 IN | BODY MASS INDEX: 29.71 KG/M2 | HEART RATE: 67 BPM | WEIGHT: 174 LBS

## 2019-09-06 DIAGNOSIS — M19.071 OSTEOARTHRITIS OF RIGHT ANKLE, UNSPECIFIED OSTEOARTHRITIS TYPE: Primary | ICD-10-CM

## 2019-09-06 PROCEDURE — 99213 OFFICE O/P EST LOW 20 MIN: CPT | Performed by: ORTHOPAEDIC SURGERY

## 2019-09-06 PROCEDURE — 20605 DRAIN/INJ JOINT/BURSA W/O US: CPT | Performed by: ORTHOPAEDIC SURGERY

## 2019-09-06 RX ORDER — LIDOCAINE HYDROCHLORIDE 10 MG/ML
1 INJECTION, SOLUTION INFILTRATION; PERINEURAL
Status: COMPLETED | OUTPATIENT
Start: 2019-09-06 | End: 2019-09-06

## 2019-09-06 RX ORDER — BETAMETHASONE SODIUM PHOSPHATE AND BETAMETHASONE ACETATE 3; 3 MG/ML; MG/ML
12 INJECTION, SUSPENSION INTRA-ARTICULAR; INTRALESIONAL; INTRAMUSCULAR; SOFT TISSUE
Status: COMPLETED | OUTPATIENT
Start: 2019-09-06 | End: 2019-09-06

## 2019-09-06 RX ORDER — BUPIVACAINE HYDROCHLORIDE 2.5 MG/ML
1 INJECTION, SOLUTION INFILTRATION; PERINEURAL
Status: COMPLETED | OUTPATIENT
Start: 2019-09-06 | End: 2019-09-06

## 2019-09-06 RX ADMIN — LIDOCAINE HYDROCHLORIDE 1 ML: 10 INJECTION, SOLUTION INFILTRATION; PERINEURAL at 14:10

## 2019-09-06 RX ADMIN — BETAMETHASONE SODIUM PHOSPHATE AND BETAMETHASONE ACETATE 12 MG: 3; 3 INJECTION, SUSPENSION INTRA-ARTICULAR; INTRALESIONAL; INTRAMUSCULAR; SOFT TISSUE at 14:10

## 2019-09-06 RX ADMIN — BUPIVACAINE HYDROCHLORIDE 1 ML: 2.5 INJECTION, SOLUTION INFILTRATION; PERINEURAL at 14:10

## 2019-09-06 NOTE — PROGRESS NOTES
69 y o female with history of R ankle OA, she presents today with 6 weeks of R ankle and foot pain since she took several flights of stairs at that time  She has tried tylenol daily with minimal relief  She denies any numbness or tingling to the foot, she denies any further complaints at this time  Pain is located across the anterior aspect of the ankle as well as the medial aspect of the foot  Review of Systems  Review of systems negative unless otherwise specified in HPI    Past Medical History  Past Medical History:   Diagnosis Date    Acid reflux 11/9/2015    Cataract     Cerebrovascular disease, ill-defined, acute     10/13/15    Degeneration of cervical intervertebral disc     Disease of thyroid gland     DVT (deep venous thrombosis) (HCC)     Heart disease     Meniere syndrome     Migraine     Osteoarthritis     Osteomalacia     Osteoporosis 3/15/2013    Patent foramen ovale     s/p percutaneous closure device    Polyp of sigmoid colon     Sciatica     of the right leg    Skin cancer     right leg (shin)    Stroke syndrome        Past Surgical History  Past Surgical History:   Procedure Laterality Date    ADENOIDECTOMY      APPENDECTOMY      BREAST EXCISIONAL BIOPSY Left 2000    CARDIAC SURGERY      CATARACT EXTRACTION      CERVICAL BIOPSY  W/ LOOP ELECTRODE EXCISION      Mild Dysplasia, 6/17/14    CHOLECYSTECTOMY      DILATION AND CURETTAGE OF UTERUS      HERNIA REPAIR      KNEE SURGERY      OTHER SURGICAL HISTORY      Treatment of Pelvis, "extensive pelvic surgery)    PATENT FORAMEN OVALE CLOSURE  08/15/2006    Percutaneous    OK COLONOSCOPY FLX DX W/COLLJ SPEC WHEN PFRMD N/A 9/7/2018    Procedure: COLONOSCOPY;  Surgeon: Amber Blas MD;  [facility];   Service: Colorectal    ROTATOR CUFF REPAIR Bilateral     SHOULDER SURGERY      TONSILLECTOMY      TOTAL ABDOMINAL HYSTERECTOMY Bilateral 2008    age 57    TOTAL ABDOMINAL HYSTERECTOMY      6/17/14    TOTAL ABDOMINAL HYSTERECTOMY W/ BILATERAL SALPINGOOPHORECTOMY Bilateral 2008    age 59    TUBAL LIGATION      UMBILICAL HERNIA REPAIR      6/17/14       Current Medications  Current Outpatient Medications on File Prior to Visit   Medication Sig Dispense Refill    ascorbic acid (CVS VITAMIN C) 1000 MG tablet Take 1 tablet by mouth daily      CALCIUM PO Take by mouth      clotrimazole-betamethasone (LOTRISONE) 1-0 05 % cream Apply topically 2 (two) times a day for 30 days 30 g 3    desoximetasone (TOPICORT) 0 25 % cream Apply topically 2 (two) times a day for 30 days 30 g 3    EPINEPHrine (EPIPEN) 0 3 mg/0 3 mL SOAJ INJECT 0 3 ML INTO A MUSCLE ONCE FOR ONE DOSE  3    Magnesium 250 MG TABS Take 1 tablet by mouth daily      meclizine (ANTIVERT) 12 5 MG tablet TAKE 1 TABLET TID PRN FOR DIZZINESS 90 tablet 1    metaxalone (SKELAXIN) 400 MG tablet Take 1 tablet daily as needed for back pain 10 tablet 0    pravastatin (PRAVACHOL) 10 mg tablet Take 1 tablet (10 mg total) by mouth daily 90 tablet 3    rivaroxaban (XARELTO) 20 mg tablet Take 1 tablet (20 mg total) by mouth daily 90 tablet 2    sertraline (ZOLOFT) 25 mg tablet Take 1 tablet (25 mg total) by mouth daily 90 tablet 3    Vitamin D, Cholecalciferol, 1000 units TABS Take by mouth 2 (two) times a day       No current facility-administered medications on file prior to visit          Recent Labs [facility])  0   Lab Value Date/Time    HCT 41 9 05/06/2019 0858    HCT 40 5 04/29/2015 0932    HGB 13 6 05/06/2019 0858    HGB 13 5 04/29/2015 0932    WBC 4 78 05/06/2019 0858    WBC 4 98 04/29/2015 0932    INR 2 60 (H) 05/23/2014 1126    GLUCOSE 95 10/07/2015 0848    HGBA1C 5 4 05/06/2019 0858    HGBA1C 5 4 11/09/2016 1116         Physical exam  · General: Awake, Alert, Oriented  · Eyes: Pupils equal, round and reactive to light  · Heart: regular rate and intact distal pulses   · Lungs: No audible wheezing  · Abdomen: soft  RLE  Skin intact  TTP over the anterior aspect of the ankle and medial aspect of the midfoot  Patient has near full ROM at the ankle  Motor and sensory exam to the foot is within normal limits  Brisk capillary refill to the foot and toes     Large joint arthrocentesis  Date/Time: 9/6/2019 2:10 PM  Consent given by: patient  Site marked: site marked  Timeout: Immediately prior to procedure a time out was called to verify the correct patient, procedure, equipment, support staff and site/side marked as required   Supporting Documentation  Indications: pain   Procedure Details  Location: Right Ankle  Needle size: 22 G  Approach: medial  Medications administered: 12 mg betamethasone acetate-betamethasone sodium phosphate 6 (3-3) mg/mL; 1 mL bupivacaine 0 25 %; 1 mL lidocaine 1 % (Toradol 1ml)    Patient tolerance: patient tolerated the procedure well with no immediate complications  Dressing:  Sterile dressing applied            Assessment/Plan:   68 y  o female with R ankle OA   WBAT RLE  Provided R ankle CSI today  NSAIDS and Tylenol as needed for pain  Will see patient back as needed

## 2019-09-27 ENCOUNTER — OFFICE VISIT (OUTPATIENT)
Dept: OBGYN CLINIC | Facility: MEDICAL CENTER | Age: 69
End: 2019-09-27
Payer: MEDICARE

## 2019-09-27 VITALS
SYSTOLIC BLOOD PRESSURE: 115 MMHG | WEIGHT: 168 LBS | DIASTOLIC BLOOD PRESSURE: 73 MMHG | HEART RATE: 74 BPM | HEIGHT: 64 IN | BODY MASS INDEX: 28.68 KG/M2

## 2019-09-27 DIAGNOSIS — M79.671 RIGHT FOOT PAIN: ICD-10-CM

## 2019-09-27 DIAGNOSIS — M76.829 PTTD (POSTERIOR TIBIAL TENDON DYSFUNCTION): Primary | ICD-10-CM

## 2019-09-27 PROCEDURE — 99213 OFFICE O/P EST LOW 20 MIN: CPT | Performed by: ORTHOPAEDIC SURGERY

## 2019-09-27 PROCEDURE — 20605 DRAIN/INJ JOINT/BURSA W/O US: CPT | Performed by: ORTHOPAEDIC SURGERY

## 2019-09-27 RX ORDER — LIDOCAINE HYDROCHLORIDE 10 MG/ML
2 INJECTION, SOLUTION INFILTRATION; PERINEURAL
Status: COMPLETED | OUTPATIENT
Start: 2019-09-27 | End: 2019-09-27

## 2019-09-27 RX ORDER — BUPIVACAINE HYDROCHLORIDE 2.5 MG/ML
2 INJECTION, SOLUTION INFILTRATION; PERINEURAL
Status: COMPLETED | OUTPATIENT
Start: 2019-09-27 | End: 2019-09-27

## 2019-09-27 RX ADMIN — LIDOCAINE HYDROCHLORIDE 2 ML: 10 INJECTION, SOLUTION INFILTRATION; PERINEURAL at 09:22

## 2019-09-27 RX ADMIN — BUPIVACAINE HYDROCHLORIDE 2 ML: 2.5 INJECTION, SOLUTION INFILTRATION; PERINEURAL at 09:22

## 2019-09-27 NOTE — PROGRESS NOTES
69 y o female presents for continued evaluation of right ankle and foot pain  During her last visit, patient received an intra-articular corticosteroid injection to the ankle  She states that she received 2 days relief with this injection, but the pain quickly returned  Pain is worse with ambulation and she finds by the day that the foot and ankle are swollen  Pain is primarily medial based, though she did feel a popping sensation in the bottom of the foot which coincided with a sharp pain  Patient does wear insoles and has had physical therapy for the right ankle, but neither of these modalities have successful result of pain  She denies any numbness or tingling into the foot or ankle  She has no other complaints at this time      Review of Systems  Review of systems negative unless otherwise specified in HPI    Past Medical History  Past Medical History:   Diagnosis Date    Acid reflux 11/9/2015    Cataract     Cerebrovascular disease, ill-defined, acute     10/13/15    Degeneration of cervical intervertebral disc     Disease of thyroid gland     DVT (deep venous thrombosis) (HCC)     Heart disease     Meniere syndrome     Migraine     Osteoarthritis     Osteomalacia     Osteoporosis 3/15/2013    Patent foramen ovale     s/p percutaneous closure device    Polyp of sigmoid colon     Sciatica     of the right leg    Skin cancer     right leg (shin)    Stroke syndrome        Past Surgical History  Past Surgical History:   Procedure Laterality Date    ADENOIDECTOMY      APPENDECTOMY      BREAST EXCISIONAL BIOPSY Left 2000    CARDIAC SURGERY      CATARACT EXTRACTION      CERVICAL BIOPSY  W/ LOOP ELECTRODE EXCISION      Mild Dysplasia, 6/17/14    CHOLECYSTECTOMY      DILATION AND CURETTAGE OF UTERUS      HERNIA REPAIR      KNEE SURGERY      OTHER SURGICAL HISTORY      Treatment of Pelvis, "extensive pelvic surgery)    PATENT FORAMEN OVALE CLOSURE  08/15/2006    Percutaneous    [place] COLONOSCOPY FLX DX W/COLLJ SPEC WHEN PFRMD N/A 9/7/2018    Procedure: COLONOSCOPY;  Surgeon: Kathryn Amanda MD;  [facility]; Service: Colorectal    ROTATOR CUFF REPAIR Bilateral     SHOULDER SURGERY      TONSILLECTOMY      TOTAL ABDOMINAL HYSTERECTOMY Bilateral 2008    age 59    TOTAL ABDOMINAL HYSTERECTOMY      6/17/14    TOTAL ABDOMINAL HYSTERECTOMY W/ BILATERAL SALPINGOOPHORECTOMY Bilateral 2008    age 59    TUBAL LIGATION      UMBILICAL HERNIA REPAIR      6/17/14       Current Medications  Current Outpatient Medications on File Prior to Visit   Medication Sig Dispense Refill    ascorbic acid ([employer]S VITAMIN C) 1000 MG tablet Take 1 tablet by mouth daily      CALCIUM PO Take by mouth      clotrimazole-betamethasone (LOTRISONE) 1-0 05 % cream Apply topically 2 (two) times a day for 30 days 30 g 3    desoximetasone (TOPICORT) 0 25 % cream Apply topically 2 (two) times a day for 30 days 30 g 3    EPINEPHrine (EPIPEN) 0 3 mg/0 3 mL SOAJ INJECT 0 3 ML INTO A MUSCLE ONCE FOR ONE DOSE  3    Magnesium 250 MG TABS Take 1 tablet by mouth daily      meclizine (ANTIVERT) 12 5 MG tablet TAKE 1 TABLET TID PRN FOR DIZZINESS 90 tablet 1    metaxalone (SKELAXIN) 400 MG tablet Take 1 tablet daily as needed for back pain 10 tablet 0    pravastatin (PRAVACHOL) 10 mg tablet Take 1 tablet (10 mg total) by mouth daily 90 tablet 3    rivaroxaban (XARELTO) 20 mg tablet Take 1 tablet (20 mg total) by mouth daily 90 tablet 2    sertraline (ZOLOFT) 25 mg tablet Take 1 tablet (25 mg total) by mouth daily 90 tablet 3    Vitamin D, Cholecalciferol, 1000 units TABS Take by mouth 2 (two) times a day       No current facility-administered medications on file prior to visit          Recent Labs [facility])  0   Lab Value Date/Time    HCT 41 9 05/06/2019 0858    HCT 40 5 04/29/2015 0932    HGB 13 6 05/06/2019 0858    HGB 13 5 04/29/2015 0932    WBC 4 78 05/06/2019 0858    WBC 4 98 04/29/2015 0932    INR 2 60 (H) 05/23/2014 1126    GLUCOSE 95 10/07/2015 0848    HGBA1C 5 4 05/06/2019 0858    HGBA1C 5 4 11/09/2016 1116         Physical exam  · General: Awake, Alert, Oriented  · Eyes: Pupils equal, round and reactive to light  · Heart: regular rate and rhythm  · Lungs: No audible wheezing  · Abdomen: soft  right Ankle  · Skin intact  · Calcaneus valgus stance  · Able to perform 1 leg heel raise with minimal discomfort  · Tenderness palpation over the posterior tibial tendon as it wraps around the medial malleolus and inserts on the plantar aspect of foot  · Motor and sensation intact otherwise to the foot and ankle  · Toes warm, sensate and mobile    Procedure  Medium joint arthrocentesis: R ankle  Date/Time: 9/27/2019 9:22 AM  Site marked: site marked  Supporting Documentation  Indications: pain   Procedure Details  Location: ankle - R ankle (PTT sheath)  Needle size: 22 G  Approach: anteromedial  Medications administered: 2 mL bupivacaine 0 25 %; 2 mL lidocaine 1 %      Toradol          Imaging  Previous radiographs of the right foot were reviewed today by myself and Dr Lanny Solis  Does reveal mild degenerative changes to the medial aspect of the ankle as well as signs of pes planus  58-year-old female with right ankle pain secondary to posterior tibial tendon insufficiency status post injection of the posterior tendon sheath with Toradol  · Weightbearing as tolerated and activity as tolerated to the right ankle with activity modification to limit impact  · Patient to continue wearing insoles and note any improvement or lack thereof regarding her ankle pain  · Patient to maintain her future appointment in October for re-evaluation of right knee and right ankle pain    · Patient understands and agrees with plan above

## 2019-10-02 ENCOUNTER — TELEPHONE (OUTPATIENT)
Dept: INTERNAL MEDICINE CLINIC | Facility: CLINIC | Age: 69
End: 2019-10-02

## 2019-10-02 NOTE — TELEPHONE ENCOUNTER
PATIENT CANNOT GET RF SCREEN , SED RATE, C-REACTIVE ,CYCLIC DUE TO HER INSURANCE NOT PAYING FOR IT      I HAVE D/C THESES ORDERS

## 2019-10-11 ENCOUNTER — OFFICE VISIT (OUTPATIENT)
Dept: CARDIOLOGY CLINIC | Facility: CLINIC | Age: 69
End: 2019-10-11
Payer: MEDICARE

## 2019-10-11 VITALS
HEIGHT: 64 IN | WEIGHT: 168.3 LBS | SYSTOLIC BLOOD PRESSURE: 126 MMHG | BODY MASS INDEX: 28.73 KG/M2 | DIASTOLIC BLOOD PRESSURE: 60 MMHG | HEART RATE: 67 BPM | OXYGEN SATURATION: 97 %

## 2019-10-11 DIAGNOSIS — Q21.1 PATENT FORAMEN OVALE: Chronic | ICD-10-CM

## 2019-10-11 DIAGNOSIS — E78.2 MIXED HYPERLIPIDEMIA: ICD-10-CM

## 2019-10-11 DIAGNOSIS — R00.2 PALPITATIONS: ICD-10-CM

## 2019-10-11 DIAGNOSIS — I48.91 ATRIAL FIBRILLATION, UNSPECIFIED TYPE (HCC): Primary | Chronic | ICD-10-CM

## 2019-10-11 PROCEDURE — 99214 OFFICE O/P EST MOD 30 MIN: CPT | Performed by: INTERNAL MEDICINE

## 2019-10-11 PROCEDURE — 93000 ELECTROCARDIOGRAM COMPLETE: CPT | Performed by: INTERNAL MEDICINE

## 2019-10-11 NOTE — PROGRESS NOTES
Cardiology Follow Up    Mary Apple  [birth year]  [id number]  [facility] [address] [address]    1  Atrial fibrillation, unspecified type ([address] )  POCT ECG   2  Patent foramen ovale     3  Mixed hyperlipidemia     4  Palpitations         Discussion/Summary:  Overall she has been doing well from a cardiac standpoint  Functional capacity has been good  Denies any significant palpitations  Blood pressures been well controlled  She is tolerating anticoagulation well with no adverse bleeding events  No further cardiac testing follow-up at 8-12 months      Interval History:   Routin follow-up visit  She has a history of paroxysmal atrial fibrillation, PFO status post closure device on anticoagulation  There have been no major medical concerns since her last visit  Denies any chest pain, shortness of breath, palpitations, lightheadedness, dizziness, or syncope  There has been no lower extremity edema, PND, orthopnea  She is tolerating anticoagulation well      Problem List     Atrial fibrillation ([facility] [address] )    Hyperlipidemia    Palpitations    Patent foramen ovale        Past Medical History:   Diagnosis Date    Acid reflux 11/9/2015    Cataract     Cerebrovascular disease, ill-defined, acute     10/13/15    Degeneration of cervical intervertebral disc     Disease of thyroid gland     DVT (deep venous thrombosis) (HCC)     Heart disease     Meniere syndrome     Migraine     Osteoarthritis     Osteomalacia     Osteoporosis 3/15/2013    Patent foramen ovale     s/p percutaneous closure device    Polyp of sigmoid colon     Sciatica     of the right leg    Skin cancer     right leg (shin)    Stroke syndrome      Social History     Socioeconomic History    Marital status: /Civil Union     Spouse name: Not on file    Number of children: Not on file    Years of education: Not on file  Highest education level: Not on file   Occupational History    Occupation: Medical Professional   Social Needs    Financial resource strain: Not on file    Food insecurity:     Worry: Not on file     Inability: Not on file    Transportation needs:     Medical: Not on file     Non-medical: Not on file   Tobacco Use    Smoking status: Never Smoker    Smokeless tobacco: Never Used    Tobacco comment: former smoker, per [employer]   Substance and Sexual Activity    Alcohol use: No     Comment: doesnt drink now as per patient she did in the past, per [employer]    Drug use: No    Sexual activity: Not on file   Lifestyle    Physical activity:     Days per week: Not on file     Minutes per session: Not on file    Stress: Not on file   Relationships    Social connections:     Talks on phone: Not on file     Gets together: Not on file     Attends [employer] service: Not on file     Active member of club or organization: Not on file     Attends meetings of clubs or organizations: Not on file     Relationship status: Not on file    Intimate partner violence:     Fear of current or ex partner: Not on file     Emotionally abused: Not on file     Physically abused: Not on file     Forced sexual activity: Not on file   Other Topics Concern    Not on file   Social History Narrative    Caffeine use, active    Daily coffee consumption, 1 cups/day    Job hazardous    Job physical requirements heavy lifting    Work related stress      Family History   Problem Relation Age of Onset    Heart disease Mother     Thyroid disease Mother         disorder    Glaucoma Mother     Dementia Mother     Diabetes Mother     Arthritis Mother     Atrial fibrillation Mother     Hypertension Mother     Mitral valve prolapse Mother         disorder    Osteoporosis Mother     Cancer Father     Asthma Father     Heart disease Father     Hypertension Father     Coronary artery disease Family     Cerebral palsy Family     Hyperlipidemia Family     Osteoporosis Family     Other Sister         PFO    Colon cancer Maternal Grandmother 36    Breast cancer Paternal Aunt 80    Endometrial cancer Paternal Aunt     Endometrial cancer Paternal Aunt     Breast cancer Cousin 78    Endometrial cancer Cousin 61     Past Surgical History:   Procedure Laterality Date    ADENOIDECTOMY      APPENDECTOMY      BREAST EXCISIONAL BIOPSY Left 2000    CARDIAC SURGERY      CATARACT EXTRACTION      CERVICAL BIOPSY  W/ LOOP ELECTRODE EXCISION      Mild Dysplasia, 6/17/14    CHOLECYSTECTOMY      DILATION AND CURETTAGE OF UTERUS      HERNIA REPAIR      KNEE SURGERY      OTHER SURGICAL HISTORY      Treatment of Pelvis, "extensive pelvic surgery)    PATENT FORAMEN OVALE CLOSURE  08/15/2006    Percutaneous    [place] COLONOSCOPY FLX DX W/COLLJ SPEC WHEN PFRMD N/A 9/7/2018    Procedure: COLONOSCOPY;  Surgeon: Grace Allen MD;  [facility]: [facility];   Service: Colorectal    ROTATOR CUFF REPAIR Bilateral     SHOULDER SURGERY      TONSILLECTOMY      TOTAL ABDOMINAL HYSTERECTOMY Bilateral 2008    age 59    TOTAL ABDOMINAL HYSTERECTOMY      6/17/14    TOTAL ABDOMINAL HYSTERECTOMY W/ BILATERAL SALPINGOOPHORECTOMY Bilateral 2008    age 59    TUBAL LIGATION      UMBILICAL HERNIA REPAIR      6/17/14       Current Outpatient Medications:     ascorbic acid (CVS VITAMIN C) 1000 MG tablet, Take 1 tablet by mouth daily, Disp: , Rfl:     CALCIUM PO, Take by mouth, Disp: , Rfl:     clotrimazole-betamethasone (LOTRISONE) 1-0 05 % cream, Apply topically 2 (two) times a day for 30 days, Disp: 30 g, Rfl: 3    desoximetasone (TOPICORT) 0 25 % cream, Apply topically 2 (two) times a day for 30 days, Disp: 30 g, Rfl: 3    EPINEPHrine (EPIPEN) 0 3 mg/0 3 mL SOAJ, INJECT 0 3 ML INTO A MUSCLE ONCE FOR ONE DOSE, Disp: , Rfl: 3    Magnesium 250 MG TABS, Take 1 tablet by mouth daily, Disp: , Rfl:     meclizine (ANTIVERT) 12 5 MG tablet, TAKE 1 TABLET TID PRN FOR DIZZINESS, Disp: 90 tablet, Rfl: 1    metaxalone (SKELAXIN) 400 MG tablet, Take 1 tablet daily as needed for back pain, Disp: 10 tablet, Rfl: 0    pravastatin (PRAVACHOL) 10 mg tablet, Take 1 tablet (10 mg total) by mouth daily, Disp: 90 tablet, Rfl: 3    rivaroxaban (XARELTO) 20 mg tablet, Take 1 tablet (20 mg total) by mouth daily, Disp: 90 tablet, Rfl: 2    sertraline (ZOLOFT) 25 mg tablet, Take 1 tablet (25 mg total) by mouth daily, Disp: 90 tablet, Rfl: 3    Vitamin D, Cholecalciferol, 1000 units TABS, Take by mouth 2 (two) times a day, Disp: , Rfl:   Allergies   Allergen Reactions    Docusate Abdominal Pain    Enoxaparin Tachycardia, Syncope and Hypertension    Lorazepam Other (See Comments)     Suicidal     Shellfish Allergy Abdominal Pain and Throat Swelling    Tramadol Angioedema     Other reaction(s): tight jaw    Codeine Sulfate Angioedema    Cortisone Vomiting and Headache    Cyclobenzaprine Tachycardia    Heparin Tachycardia, Syncope and Hypertension    Lactose Diarrhea    Other      Annotation - [id number]: Alcian  Adhesive tape    Tetracycline Angioedema    Vioxx [Rofecoxib] Angioedema    Celecoxib Rash    Chocolate Headache     **per pt not allergic, just gets migranes     Indomethacin Palpitations    Influenza A (H1n1) Monovalent Vaccine      **Per pt she used to have a problem, but no longer allergic to eggs, gets yearly     Neomycin Sulfate [Neomycin] Rash    Neosporin [Neomycin-Bacitracin Zn-Polymyx] Rash    Nitrofurantoin Rash    Propranolol Palpitations    Sulfamethoxazole-Trimethoprim Rash       Labs:     Chemistry        Component Value Date/Time     10/07/2015 0848    K 4 8 05/06/2019 0858    K 4 6 10/07/2015 0848     (H) 05/06/2019 0858     (H) 10/07/2015 0848    CO2 26 05/06/2019 0858    CO2 28 10/07/2015 0848    BUN 21 05/06/2019 0858    BUN 23 10/07/2015 0848    CREATININE 0 89 05/06/2019 0858    CREATININE 0 75 10/07/2015 0848 Component Value Date/Time    CALCIUM 9 5 05/06/2019 0858    CALCIUM 9 4 10/07/2015 0848    ALKPHOS 70 05/06/2019 0858    ALKPHOS 41 (L) 10/07/2015 0848    AST 18 05/06/2019 0858    AST 13 10/07/2015 0848    ALT 31 05/06/2019 0858    ALT 29 10/07/2015 0848    BILITOT 0 70 10/07/2015 0848            Lab Results   Component Value Date    CHOL 129 09/04/2014     Lab Results   Component Value Date    HDL 38 (L) 05/06/2019    HDL 42 11/05/2018    HDL 48 05/07/2018     Lab Results   Component Value Date    LDLCALC 81 05/06/2019    LDLCALC 69 09/04/2014     Lab Results   Component Value Date    TRIG 111 05/06/2019    TRIG 125 11/05/2018    TRIG 73 05/07/2018     No results found for: CHOLHDL    Imaging: Mammo Screening Bilateral W Cad    Result Date: 4/6/2018  Narrative: Patient History: Patient is postmenopausal and has history of other cancer  Family history of premenopausal colorectal cancer at age 36 in maternal grandmother, breast cancer at age 80 and endometrial cancer at age 48 or over in paternal aunt, endometrial cancer at age 48 or over in paternal aunt, breast cancer at age 78 and endometrial cancer at age 61 in paternal cousin  Benign excisional biopsy of the left breast, 2000  Took hormonal contraceptives for 1 year  Took estrogen for 14 years  Patient is a former smoker, and smoked for 1 year  Patient's BMI is 29 2  Reason for exam: screening, asymptomatic  Mammo Screening Bilateral W CAD: April 6, 2018 - Check In #: [de-identified] Bilateral CC and MLO view(s) were taken  Technologist: RT Keri(R)(M) Prior study comparison: March 17, 2017, mammo screening bilateral W CAD performed at [facility]  March 11, 2016, mammo screening bilateral W CAD performed at [facility]  January 7, 2015, digital bilateral screening mammogram performed at [facility]  January 30, 2013, digital bilateral screening mammogram performed at [address] [place] [address]    January 26, 2012, right breast unilateral diagnostic mammogram, performed at [address] [place] [address]  January 19, 2012, digital bilateral screening mammogram performed at [facility]  There are scattered fibroglandular densities  No dominant soft tissue mass, architectural distortion or suspicious calcifications are noted in either breast   The skin and nipple contours are within normal limits  IMPRESSION: No evidence of malignancy  No significant changes when compared with prior studies  ACR BI-RADS® Assessments: BiRad:1 - Negative Recommendation: Routine screening mammogram of both breasts in 1 year  Analyzed by CAD The patient is scheduled in a reminder system for screening mammography  8-10% of cancers will be missed on mammography  Management of a palpable abnormality must be based on clinical grounds  Patients will be notified of their results via letter from our facility  Accredited by [employer] of [employer] and FDA  Transcription Location: [facility] Signing Station: [id number] Risk Value(s): Tyrer-Cuzick 10 Year: 3 700%, Tyrer-Cuzick Lifetime: 6 300%, Myriad Table: 1 5%, DMITRY 5 Year: 2 0%, NCI Lifetime: 6 0%, MRS : Based on personal and/or family history, consideration of hereditary risk assessment may be warranted  ECG:  Normal sinus rhythm unremarkable tracing      Review of Systems   Constitution: Negative  HENT: Negative  Eyes: Negative  Cardiovascular: Negative  Negative for chest pain, dyspnea on exertion, leg swelling, palpitations and syncope  Respiratory: Negative  Endocrine: Negative  Hematologic/Lymphatic: Negative  Skin: Negative  Musculoskeletal: Negative  Gastrointestinal: Negative  Genitourinary: Negative  Neurological: Negative  Psychiatric/Behavioral: Negative          Vitals:    10/11/19 1135   BP: 126/60   Pulse: 67   SpO2: 97%     Vitals:    10/11/19 1135   Weight: 76 3 kg (168 lb 4 8 oz)     Height: 5' 4" (162 6 cm)   Body mass index is 28 89 kg/m²      Physical Exam:  General:  Alert and cooperative, appears stated age  HEENT:  PERRLA, EOMI, no scleral icterus, no conjunctival pallor  Neck:  No lymphadenopathy, no thyromegaly, no carotid bruits, no elevated JVP  Heart:  Regular rate and rhythm, normal S1/S2, no S3/S4, no murmur  Lungs:  Clear to auscultation bilaterally   Abdomen:  Soft, non-tender, positive bowel sounds, no rebound or guarding,   no organomegaly   Extremities:  No clubbing, cyanosis or edema   Vascular:  2+ pedal pulses  Skin:  No rashes or lesions on exposed skin  Neurologic:  Cranial nerves II-XII grossly intact without focal deficits

## 2019-11-05 DIAGNOSIS — R42 DIZZINESS: ICD-10-CM

## 2019-11-05 RX ORDER — MECLIZINE HCL 12.5 MG/1
TABLET ORAL
Qty: 90 TABLET | Refills: 0 | Status: SHIPPED | OUTPATIENT
Start: 2019-11-05 | End: 2020-01-07 | Stop reason: SDUPTHER

## 2019-11-06 ENCOUNTER — APPOINTMENT (OUTPATIENT)
Dept: LAB | Facility: CLINIC | Age: 69
End: 2019-11-06
Payer: MEDICARE

## 2019-11-06 ENCOUNTER — TRANSCRIBE ORDERS (OUTPATIENT)
Dept: LAB | Facility: CLINIC | Age: 69
End: 2019-11-06

## 2019-11-06 DIAGNOSIS — E55.9 VITAMIN D DEFICIENCY: Chronic | ICD-10-CM

## 2019-11-06 DIAGNOSIS — M25.50 ARTHRALGIA, UNSPECIFIED JOINT: ICD-10-CM

## 2019-11-06 DIAGNOSIS — M62.838 MUSCLE SPASM: ICD-10-CM

## 2019-11-06 DIAGNOSIS — I48.20 CHRONIC ATRIAL FIBRILLATION (HCC): Chronic | ICD-10-CM

## 2019-11-06 DIAGNOSIS — E78.2 MIXED HYPERLIPIDEMIA: ICD-10-CM

## 2019-11-06 DIAGNOSIS — E21.3 HYPERPARATHYROIDISM (HCC): Chronic | ICD-10-CM

## 2019-11-06 DIAGNOSIS — F32.9 REACTIVE DEPRESSION: ICD-10-CM

## 2019-11-06 DIAGNOSIS — T78.03XS ANAPHYLACTIC SHOCK DUE TO SEAFOOD, SEQUELA: ICD-10-CM

## 2019-11-06 DIAGNOSIS — M81.0 OSTEOPOROSIS WITHOUT CURRENT PATHOLOGICAL FRACTURE, UNSPECIFIED OSTEOPOROSIS TYPE: Chronic | ICD-10-CM

## 2019-11-06 LAB
ALBUMIN SERPL BCP-MCNC: 3.9 G/DL (ref 3.5–5)
ALP SERPL-CCNC: 59 U/L (ref 46–116)
ALT SERPL W P-5'-P-CCNC: 27 U/L (ref 12–78)
ANION GAP SERPL CALCULATED.3IONS-SCNC: 7 MMOL/L (ref 4–13)
AST SERPL W P-5'-P-CCNC: 17 U/L (ref 5–45)
BASOPHILS # BLD AUTO: 0.04 THOUSANDS/ΜL (ref 0–0.1)
BASOPHILS NFR BLD AUTO: 1 % (ref 0–1)
BILIRUB SERPL-MCNC: 0.8 MG/DL (ref 0.2–1)
BUN SERPL-MCNC: 20 MG/DL (ref 5–25)
CALCIUM SERPL-MCNC: 9.4 MG/DL (ref 8.3–10.1)
CHLORIDE SERPL-SCNC: 108 MMOL/L (ref 100–108)
CHOLEST SERPL-MCNC: 146 MG/DL (ref 50–200)
CO2 SERPL-SCNC: 28 MMOL/L (ref 21–32)
CREAT SERPL-MCNC: 0.84 MG/DL (ref 0.6–1.3)
EOSINOPHIL # BLD AUTO: 0.15 THOUSAND/ΜL (ref 0–0.61)
EOSINOPHIL NFR BLD AUTO: 3 % (ref 0–6)
ERYTHROCYTE [DISTWIDTH] IN BLOOD BY AUTOMATED COUNT: 13.5 % (ref 11.6–15.1)
GFR SERPL CREATININE-BSD FRML MDRD: 70 ML/MIN/1.73SQ M
GLUCOSE P FAST SERPL-MCNC: 91 MG/DL (ref 65–99)
HCT VFR BLD AUTO: 42.8 % (ref 34.8–46.1)
HDLC SERPL-MCNC: 46 MG/DL
HGB BLD-MCNC: 13.8 G/DL (ref 11.5–15.4)
IMM GRANULOCYTES # BLD AUTO: 0.01 THOUSAND/UL (ref 0–0.2)
IMM GRANULOCYTES NFR BLD AUTO: 0 % (ref 0–2)
LDLC SERPL CALC-MCNC: 82 MG/DL (ref 0–100)
LYMPHOCYTES # BLD AUTO: 1.78 THOUSANDS/ΜL (ref 0.6–4.47)
LYMPHOCYTES NFR BLD AUTO: 38 % (ref 14–44)
MCH RBC QN AUTO: 30.9 PG (ref 26.8–34.3)
MCHC RBC AUTO-ENTMCNC: 32.2 G/DL (ref 31.4–37.4)
MCV RBC AUTO: 96 FL (ref 82–98)
MONOCYTES # BLD AUTO: 0.32 THOUSAND/ΜL (ref 0.17–1.22)
MONOCYTES NFR BLD AUTO: 7 % (ref 4–12)
NEUTROPHILS # BLD AUTO: 2.41 THOUSANDS/ΜL (ref 1.85–7.62)
NEUTS SEG NFR BLD AUTO: 51 % (ref 43–75)
NRBC BLD AUTO-RTO: 0 /100 WBCS
PLATELET # BLD AUTO: 211 THOUSANDS/UL (ref 149–390)
PMV BLD AUTO: 10.1 FL (ref 8.9–12.7)
POTASSIUM SERPL-SCNC: 4.7 MMOL/L (ref 3.5–5.3)
PROT SERPL-MCNC: 6.9 G/DL (ref 6.4–8.2)
PTH-INTACT SERPL-MCNC: 92.7 PG/ML (ref 18.4–80.1)
RBC # BLD AUTO: 4.47 MILLION/UL (ref 3.81–5.12)
SODIUM SERPL-SCNC: 143 MMOL/L (ref 136–145)
TRIGL SERPL-MCNC: 90 MG/DL
WBC # BLD AUTO: 4.71 THOUSAND/UL (ref 4.31–10.16)

## 2019-11-06 PROCEDURE — 83970 ASSAY OF PARATHORMONE: CPT

## 2019-11-06 PROCEDURE — 80061 LIPID PANEL: CPT

## 2019-11-06 PROCEDURE — 80053 COMPREHEN METABOLIC PANEL: CPT

## 2019-11-06 PROCEDURE — 36415 COLL VENOUS BLD VENIPUNCTURE: CPT

## 2019-11-06 PROCEDURE — 85025 COMPLETE CBC W/AUTO DIFF WBC: CPT

## 2019-11-21 ENCOUNTER — OFFICE VISIT (OUTPATIENT)
Dept: INTERNAL MEDICINE CLINIC | Facility: CLINIC | Age: 69
End: 2019-11-21
Payer: MEDICARE

## 2019-11-21 VITALS
SYSTOLIC BLOOD PRESSURE: 124 MMHG | WEIGHT: 167.6 LBS | HEART RATE: 64 BPM | DIASTOLIC BLOOD PRESSURE: 72 MMHG | HEIGHT: 64 IN | BODY MASS INDEX: 28.61 KG/M2 | OXYGEN SATURATION: 98 %

## 2019-11-21 DIAGNOSIS — Z13.29 SCREENING FOR THYROID DISORDER: ICD-10-CM

## 2019-11-21 DIAGNOSIS — M25.50 ARTHRALGIA, UNSPECIFIED JOINT: ICD-10-CM

## 2019-11-21 DIAGNOSIS — E21.3 HYPERPARATHYROIDISM (HCC): Primary | ICD-10-CM

## 2019-11-21 DIAGNOSIS — Z12.39 SCREENING FOR BREAST CANCER: ICD-10-CM

## 2019-11-21 DIAGNOSIS — K08.89 PAIN, DENTAL: ICD-10-CM

## 2019-11-21 DIAGNOSIS — I48.20 CHRONIC ATRIAL FIBRILLATION (HCC): ICD-10-CM

## 2019-11-21 DIAGNOSIS — L08.9 SKIN INFECTION: ICD-10-CM

## 2019-11-21 DIAGNOSIS — M62.838 MUSCLE SPASM: ICD-10-CM

## 2019-11-21 DIAGNOSIS — Z12.31 ENCOUNTER FOR SCREENING MAMMOGRAM FOR MALIGNANT NEOPLASM OF BREAST: ICD-10-CM

## 2019-11-21 DIAGNOSIS — E55.9 VITAMIN D DEFICIENCY: ICD-10-CM

## 2019-11-21 DIAGNOSIS — Z13.1 SCREENING FOR DIABETES MELLITUS: ICD-10-CM

## 2019-11-21 DIAGNOSIS — Z23 INFLUENZA VACCINE NEEDED: ICD-10-CM

## 2019-11-21 DIAGNOSIS — E78.2 MIXED HYPERLIPIDEMIA: ICD-10-CM

## 2019-11-21 PROCEDURE — G0008 ADMIN INFLUENZA VIRUS VAC: HCPCS

## 2019-11-21 PROCEDURE — 90682 RIV4 VACC RECOMBINANT DNA IM: CPT

## 2019-11-21 PROCEDURE — 99214 OFFICE O/P EST MOD 30 MIN: CPT | Performed by: INTERNAL MEDICINE

## 2019-11-21 RX ORDER — CLOTRIMAZOLE AND BETAMETHASONE DIPROPIONATE 10; .64 MG/G; MG/G
CREAM TOPICAL 2 TIMES DAILY PRN
Qty: 45 G | Refills: 2 | Status: SHIPPED | OUTPATIENT
Start: 2019-11-21 | End: 2021-11-30 | Stop reason: SDUPTHER

## 2019-11-21 RX ORDER — AMOXICILLIN 500 MG/1
CAPSULE ORAL
Qty: 8 CAPSULE | Refills: 2 | Status: SHIPPED | OUTPATIENT
Start: 2019-11-21 | End: 2020-01-07 | Stop reason: SDUPTHER

## 2019-11-21 RX ORDER — METAXALONE 400 MG/1
400 TABLET ORAL 3 TIMES DAILY PRN
Qty: 10 TABLET | Refills: 2 | Status: SHIPPED | OUTPATIENT
Start: 2019-11-21 | End: 2020-11-30 | Stop reason: SDUPTHER

## 2019-11-21 NOTE — PROGRESS NOTES
Assessment/Plan:    #Diffuse Joint Pains  -reports chronic pain  -defers RF, ESR and CRP at this time    #Valgus Leg  -over right foot and was fitted for orthotic by podiatry  -still sees orthopedics and underwent ketorolac injection with relief    #Meniere's  -chronic history of  -relieved with meclizine prn  -unable to tolerate HCTZ due to chest heaviness, leg cramps, fatigue and elevated BP  -MRI brain by ENT reveals no acoustic neuroma    #Osteoarthritis of Jaw  -jaw pain initially thought to be related to prolia however MRI performed showed mild DJD  -was on prolia and bisphonates in the past but now stopped  -continue to monitor  -repeat 2019 DEXA shows osteopenia    #Hyperparathyroidism  -chronic history of  -PTH 92 7  -previous saw endocrine who recommended continuing with calcium and vitamin D  -calcium levels remain stable  -parathryoid scans were without issues  -continue to trend PTH    #GERD  -asymptomatic  -controlled with PPI prn  -headaches with nexium and omeprazole    #Depression  -currently on sertraline  -mother is now in a facility and alleviates much of patient's stress    #HLD  -LDL 82 HDL 46   -continue pravastatin 4 days a week    #Atrial Fibrillation  -sees cardiology yearly  -symptoms arose after cortisone injection which patient no longer receiving    #Patent Foramen Ovale  -surgical repaired in the past  -currently on xarelto  -does dental prophylaxis with amoxicillin, cardiology says this can stop however dentist is insisting to continue    #Right Breast Cyst  -seen on mammogram and US  -benign and continue with yearly mammogram    #Muscle Spasms  -back spasms  -currently controlled on skelexin    #Vit D Deficiency  -continue vitamin D    #Health Maintenance   -return to care 1 year with routine labs  -colonoscopy 2018  -flu vaccine 2019  -PNA 2014 and 2016  -mammogram 2019 and repeat in 4/2020  -DEXA completed 2019    BMI Counseling: Body mass index is 28 77 kg/m²   The BMI is above normal  Nutrition recommendations include decreasing overall calorie intake and 3-5 servings of fruits/vegetables daily  Exercise recommendations include vigorous aerobic physical activity for 75 minutes/week  No problem-specific Assessment & Plan notes found for this encounter  Diagnoses and all orders for this visit:    Hyperparathyroidism ([address] )  -     CBC and differential; Future  -     Vitamin D 25 hydroxy; Future  -     Lipid Panel with Direct LDL reflex; Future  -     TSH, 3rd generation with Free T4 reflex; Future  -     Comprehensive metabolic panel; Future  -     PTH, intact; Future    Muscle spasm  -     metaxalone (SKELAXIN) 400 MG tablet; Take 1 tablet (400 mg total) by mouth 3 (three) times a day as needed (back pain) Take 1 tablet daily as needed for back pain  -     CBC and differential; Future  -     Vitamin D 25 hydroxy; Future  -     Lipid Panel with Direct LDL reflex; Future  -     TSH, 3rd generation with Free T4 reflex; Future  -     Comprehensive metabolic panel; Future    Skin infection  -     clotrimazole-betamethasone (LOTRISONE) 1-0 05 % cream; Apply topically 2 (two) times a day as needed (rash)  -     CBC and differential; Future  -     Vitamin D 25 hydroxy; Future  -     Lipid Panel with Direct LDL reflex; Future  -     TSH, 3rd generation with Free T4 reflex; Future  -     Comprehensive metabolic panel; Future    Pain, dental  -     amoxicillin (AMOXIL) 500 mg capsule; Take 4 tablets prior to dental procedure  -     CBC and differential; Future  -     Vitamin D 25 hydroxy; Future  -     Lipid Panel with Direct LDL reflex; Future  -     TSH, 3rd generation with Free T4 reflex; Future  -     Comprehensive metabolic panel;  Future    Influenza vaccine needed  -     Cancel: FLUBLOK: influenza vaccine, quadrivalent, recombinant, PF, 0 5 mL  -     Cancel: influenza vaccine, 7599-0514, high-dose, PF 0 5 mL (FLUZONE HIGH-DOSE)  -     CBC and differential; Future  -     Vitamin D 25 hydroxy; Future  -     Lipid Panel with Direct LDL reflex; Future  -     TSH, 3rd generation with Free T4 reflex; Future  -     Comprehensive metabolic panel; Future  -     influenza vaccine, [id number], quadrivalent, recombinant, PF, 0 5 mL, for patients 18 yr+ (FLUBLOK)    Mixed hyperlipidemia  -     CBC and differential; Future  -     Vitamin D 25 hydroxy; Future  -     Lipid Panel with Direct LDL reflex; Future  -     TSH, 3rd generation with Free T4 reflex; Future  -     Comprehensive metabolic panel; Future    Vitamin D deficiency  -     CBC and differential; Future  -     Vitamin D 25 hydroxy; Future  -     Lipid Panel with Direct LDL reflex; Future  -     TSH, 3rd generation with Free T4 reflex; Future  -     Comprehensive metabolic panel; Future    Chronic atrial fibrillation  -     CBC and differential; Future  -     Vitamin D 25 hydroxy; Future  -     Lipid Panel with Direct LDL reflex; Future  -     TSH, 3rd generation with Free T4 reflex; Future  -     Comprehensive metabolic panel; Future    Arthralgia, unspecified joint  -     CBC and differential; Future  -     Vitamin D 25 hydroxy; Future  -     Lipid Panel with Direct LDL reflex; Future  -     TSH, 3rd generation with Free T4 reflex; Future  -     Comprehensive metabolic panel; Future    Screening for thyroid disorder  -     CBC and differential; Future  -     Vitamin D 25 hydroxy; Future  -     Lipid Panel with Direct LDL reflex; Future  -     TSH, 3rd generation with Free T4 reflex; Future  -     Comprehensive metabolic panel; Future    Screening for breast cancer  -     Mammo screening bilateral w cad; Future  -     CBC and differential; Future  -     Vitamin D 25 hydroxy; Future  -     Lipid Panel with Direct LDL reflex; Future  -     TSH, 3rd generation with Free T4 reflex; Future  -     Comprehensive metabolic panel;  Future    Screening for diabetes mellitus  -     Hemoglobin A1C; Future  -     TSH, 3rd generation with Free T4 reflex; Future  -     Comprehensive metabolic panel; Future    Encounter for screening mammogram for malignant neoplasm of breast   -     Mammo screening bilateral w cad; Future    Other orders  -     Cancel: influenza vaccine, [id number], high-dose, PF 0 5 mL (FLUZONE HIGH-DOSE)            Current Outpatient Medications:     ascorbic acid ([employer]S VITAMIN C) 1000 MG tablet, Take 1 tablet by mouth daily, Disp: , Rfl:     CALCIUM PO, Take by mouth, Disp: , Rfl:     clotrimazole-betamethasone (LOTRISONE) 1-0 05 % cream, Apply topically 2 (two) times a day as needed (rash), Disp: 45 g, Rfl: 2    desoximetasone (TOPICORT) 0 25 % cream, Apply topically 2 (two) times a day for 30 days, Disp: 30 g, Rfl: 3    EPINEPHrine (EPIPEN) 0 3 mg/0 3 mL SOAJ, INJECT 0 3 ML INTO A MUSCLE ONCE FOR ONE DOSE, Disp: , Rfl: 3    Magnesium 250 MG TABS, Take 1 tablet by mouth daily, Disp: , Rfl:     meclizine (ANTIVERT) 12 5 MG tablet, TAKE 1 TABLET BY MOUTH THREE TIMES DAILY AS NEEDED FOR DIZZINESS, Disp: 90 tablet, Rfl: 0    metaxalone (SKELAXIN) 400 MG tablet, Take 1 tablet (400 mg total) by mouth 3 (three) times a day as needed (back pain) Take 1 tablet daily as needed for back pain, Disp: 10 tablet, Rfl: 2    pravastatin (PRAVACHOL) 10 mg tablet, Take 1 tablet (10 mg total) by mouth daily (Patient taking differently: Take 10 mg by mouth 4 (four) times a week ), Disp: 90 tablet, Rfl: 3    rivaroxaban (XARELTO) 20 mg tablet, Take 1 tablet (20 mg total) by mouth daily, Disp: 90 tablet, Rfl: 2    sertraline (ZOLOFT) 25 mg tablet, Take 1 tablet (25 mg total) by mouth daily, Disp: 90 tablet, Rfl: 3    Vitamin D, Cholecalciferol, 1000 units TABS, Take by mouth 2 (two) times a day, Disp: , Rfl:     amoxicillin (AMOXIL) 500 mg capsule, Take 4 tablets prior to dental procedure, Disp: 8 capsule, Rfl: 2    Subjective:      Patient ID: Buster Valenzuela is a 68 y o  female  HPI     Patient presents for routine visit    Denies any recent hospitalizations or surgeries  States that she [facility] he removed some skin lesions on her bilateral lower legs as well as spine hernia face  She will return in 1 month for routine checkup again with them  She does have a history of basal cell cancer as well as squamous cell cancer of the skin  Patient also has a history of valgus leg and had been fitted with an orthotic with Podiatry and continues to have occasional tendinitis  She underwent a Toradol injection which helped her symptoms with Orthopedics  She also has a history of Meniere's disease but uses meclizine as needed without any recent flare-ups  She also reports that she has an issue with diarrhea however that has resolved after she stopped eating gluten  She states that she lost 10 lb and feels much better at this time  Patient has a history of acid reflux and continues with the PPI intermittently  She has a history of depression and is on sertraline and doing well on this medication  She also has a history of back spasms and is well controlled on Skelaxin as needed  She has a history of a patent foramen ovale and whenever she has dental work done she needs amoxicillin prophylaxis which we prescribed for her  She remains on Xarelto for atrial fibrillation  Patient will return to care in 1 year with labs  Her HDL is stable at 46 and LDL 82  Patient will return to care in 6 months with labs  The following portions of the patient's history were reviewed and updated as appropriate: allergies, current medications, past family history, past medical history, past social history, past surgical history and problem list     Review of Systems   Constitutional: Negative for activity change, appetite change, chills, fatigue and fever  HENT: Negative for congestion, ear pain, facial swelling, hearing loss, sore throat, tinnitus and trouble swallowing  Eyes: Negative for photophobia and visual disturbance     Respiratory: Negative for cough, shortness of breath and wheezing  Cardiovascular: Positive for leg swelling (in legs)  Negative for chest pain  Gastrointestinal: Negative for abdominal distention, abdominal pain, blood in stool, nausea and vomiting  Genitourinary: Negative for difficulty urinating, dysuria and pelvic pain  Musculoskeletal: Positive for arthralgias (diffuse) and back pain (lower)  Negative for gait problem, joint swelling, myalgias, neck pain and neck stiffness  Skin: Negative for rash and wound  Neurological: Negative for dizziness, tremors, light-headedness and headaches  Psychiatric/Behavioral: Negative for decreased concentration, dysphoric mood, self-injury, sleep disturbance and suicidal ideas  Objective:      /72 (BP Location: Left arm, Patient Position: Sitting, Cuff Size: Standard)   Pulse 64   Ht 5' 4" (1 626 m)   Wt 76 kg (167 lb 9 6 oz)   SpO2 98%   BMI 28 77 kg/m²          Physical Exam   Constitutional: She is oriented to person, place, and time  She appears well-developed and well-nourished  HENT:   Head: Normocephalic and atraumatic  Right Ear: External ear normal    Left Ear: External ear normal    Nose: Nose normal    Mouth/Throat: Oropharynx is clear and moist    Eyes: Pupils are equal, round, and reactive to light  Conjunctivae and EOM are normal    Neck: Normal range of motion  Neck supple  No JVD present  No thyromegaly present  Cardiovascular: Normal rate, regular rhythm, normal heart sounds and intact distal pulses  No murmur heard  Pulmonary/Chest: Effort normal and breath sounds normal  No stridor  No respiratory distress  She has no wheezes  Abdominal: Soft  Bowel sounds are normal  She exhibits no distension and no mass  There is no tenderness  There is no rebound and no guarding  Musculoskeletal: Normal range of motion  She exhibits tenderness (right medial malleolus)  She exhibits no edema or deformity     Lymphadenopathy:     She has no cervical adenopathy  Neurological: She is alert and oriented to person, place, and time  She has normal reflexes  Skin: Skin is warm  No rash noted  No erythema  Vitals reviewed

## 2019-12-13 DIAGNOSIS — I48.0 PAROXYSMAL ATRIAL FIBRILLATION (HCC): ICD-10-CM

## 2020-01-02 DIAGNOSIS — I48.0 PAROXYSMAL ATRIAL FIBRILLATION (HCC): ICD-10-CM

## 2020-01-07 ENCOUNTER — OFFICE VISIT (OUTPATIENT)
Dept: INTERNAL MEDICINE CLINIC | Facility: CLINIC | Age: 70
End: 2020-01-07
Payer: MEDICARE

## 2020-01-07 VITALS
RESPIRATION RATE: 15 BRPM | HEART RATE: 71 BPM | SYSTOLIC BLOOD PRESSURE: 122 MMHG | OXYGEN SATURATION: 98 % | DIASTOLIC BLOOD PRESSURE: 78 MMHG

## 2020-01-07 DIAGNOSIS — J34.89 SINUS DRAINAGE: Primary | ICD-10-CM

## 2020-01-07 DIAGNOSIS — R42 DIZZINESS: ICD-10-CM

## 2020-01-07 DIAGNOSIS — F32.9 REACTIVE DEPRESSION: ICD-10-CM

## 2020-01-07 DIAGNOSIS — K08.89 PAIN, DENTAL: ICD-10-CM

## 2020-01-07 DIAGNOSIS — G25.81 RESTLESS LEG: ICD-10-CM

## 2020-01-07 PROCEDURE — 99213 OFFICE O/P EST LOW 20 MIN: CPT | Performed by: INTERNAL MEDICINE

## 2020-01-07 RX ORDER — MECLIZINE HCL 12.5 MG/1
TABLET ORAL
Qty: 90 TABLET | Refills: 0 | Status: SHIPPED | OUTPATIENT
Start: 2020-01-07 | End: 2020-04-16 | Stop reason: SDUPTHER

## 2020-01-07 RX ORDER — PRAMIPEXOLE DIHYDROCHLORIDE 0.12 MG/1
0.12 TABLET ORAL 3 TIMES DAILY
Qty: 270 TABLET | Refills: 0 | Status: SHIPPED | OUTPATIENT
Start: 2020-01-07 | End: 2021-11-30

## 2020-01-07 RX ORDER — AMOXICILLIN 500 MG/1
CAPSULE ORAL
Qty: 8 CAPSULE | Refills: 2 | Status: SHIPPED | OUTPATIENT
Start: 2020-01-07 | End: 2020-11-30 | Stop reason: SDUPTHER

## 2020-01-07 RX ORDER — SERTRALINE HYDROCHLORIDE 25 MG/1
25 TABLET, FILM COATED ORAL DAILY
Qty: 90 TABLET | Refills: 3 | Status: SHIPPED | OUTPATIENT
Start: 2020-01-07 | End: 2020-11-30 | Stop reason: SDUPTHER

## 2020-01-07 NOTE — PROGRESS NOTES
Assessment/Plan:    #Travis  -Dariana lama negative today  -has been taking meclizine with relief  -continue for now  -no issues with hearing loss or cranial nerve deficits    #Sinus Congestion  -reports worse at nighttime  -mild nasal congestion  -denies fever, headache, coughing, sore throat  -start on rhinocort    #Restless Legs  -reports symptoms worsening and bothersome at night  -start on pramipexole    #Dental Pain  -refilled amoxicillin for patient's dental prophylaxis    Addendum 9/9/20 mammogram unremarkable    For comprehensive progress note refer to 11/21/2019    No problem-specific Assessment & Plan notes found for this encounter  Diagnoses and all orders for this visit:    Sinus drainage  -     budesonide (RINOCORT AQUA) 32 MCG/ACT nasal spray; 1 spray into each nostril daily    Dizziness  -     meclizine (ANTIVERT) 12 5 MG tablet; TAKE 1 TABLET BY MOUTH THREE TIMES DAILY AS NEEDED FOR DIZZINESS    Pain, dental  -     amoxicillin (AMOXIL) 500 mg capsule; Take 4 tablets prior to dental procedure    Restless leg  -     pramipexole (MIRAPEX) 0 125 mg tablet; Take 1 tablet (0 125 mg total) by mouth 3 (three) times a day    Reactive depression  -     sertraline (ZOLOFT) 25 mg tablet;  Take 1 tablet (25 mg total) by mouth daily            Current Outpatient Medications:     amoxicillin (AMOXIL) 500 mg capsule, Take 4 tablets prior to dental procedure, Disp: 8 capsule, Rfl: 2    ascorbic acid (CVS VITAMIN C) 1000 MG tablet, Take 1 tablet by mouth daily, Disp: , Rfl:     budesonide (RINOCORT AQUA) 32 MCG/ACT nasal spray, 1 spray into each nostril daily, Disp: 1 Bottle, Rfl: 1    CALCIUM PO, Take by mouth, Disp: , Rfl:     clotrimazole-betamethasone (LOTRISONE) 1-0 05 % cream, Apply topically 2 (two) times a day as needed (rash), Disp: 45 g, Rfl: 2    desoximetasone (TOPICORT) 0 25 % cream, Apply topically 2 (two) times a day for 30 days, Disp: 30 g, Rfl: 3    EPINEPHrine (EPIPEN) 0 3 mg/0 3 mL SOAJ, INJECT 0 3 ML INTO A MUSCLE ONCE FOR ONE DOSE, Disp: , Rfl: 3    Magnesium 250 MG TABS, Take 1 tablet by mouth daily, Disp: , Rfl:     meclizine (ANTIVERT) 12 5 MG tablet, TAKE 1 TABLET BY MOUTH THREE TIMES DAILY AS NEEDED FOR DIZZINESS, Disp: 90 tablet, Rfl: 0    metaxalone (SKELAXIN) 400 MG tablet, Take 1 tablet (400 mg total) by mouth 3 (three) times a day as needed (back pain) Take 1 tablet daily as needed for back pain, Disp: 10 tablet, Rfl: 2    pramipexole (MIRAPEX) 0 125 mg tablet, Take 1 tablet (0 125 mg total) by mouth 3 (three) times a day, Disp: 270 tablet, Rfl: 0    pravastatin (PRAVACHOL) 10 mg tablet, Take 1 tablet (10 mg total) by mouth daily (Patient taking differently: Take 10 mg by mouth 4 (four) times a week ), Disp: 90 tablet, Rfl: 3    rivaroxaban (XARELTO) 20 mg tablet, Take 1 tablet (20 mg total) by mouth daily, Disp: 30 tablet, Rfl: 3    sertraline (ZOLOFT) 25 mg tablet, Take 1 tablet (25 mg total) by mouth daily, Disp: 90 tablet, Rfl: 3    Vitamin D, Cholecalciferol, 1000 units TABS, Take by mouth 2 (two) times a day, Disp: , Rfl:     Subjective:      Patient ID: Brigida Milan is a 68 y o  female  HPI    Patient presents for an acute visit  Reports that for the past month she has been having profuse bouts of vertigo  She states that the meds are helping but she still gets frequent bouts especially in the evening  She states that her runny nose feels congested and she feels some significant pressure behind her eye orbit  She denies any fevers or sore throat or cough  She reports that she had similar symptoms in the past and went to ENT with the prescribed her meclizine as well as Rhinocort  We suggested that she resume and continue these medications  Neurologic exam today was intact without any cranial nerve issues  Patient denies any paresthesias or any discomfort with walking    Patient also complains of bilateral lower extremity muscle cramping as well as restless legs at night  We will start her on pramipexole to see if this will help  She will call back within 2 weeks if symptoms persist or do not improve  The following portions of the patient's history were reviewed and updated as appropriate: allergies, current medications, past family history, past medical history, past social history, past surgical history and problem list     Review of Systems   Constitutional: Negative for activity change, appetite change, chills, fatigue and fever  HENT: Positive for congestion (mild)  Negative for ear pain, facial swelling, hearing loss, sore throat, tinnitus and trouble swallowing  Eyes: Negative for photophobia and visual disturbance  Respiratory: Negative for cough, shortness of breath and wheezing  Cardiovascular: Positive for leg swelling (in legs)  Negative for chest pain  Gastrointestinal: Negative for abdominal distention, abdominal pain, blood in stool, nausea and vomiting  Genitourinary: Negative for difficulty urinating, dysuria and pelvic pain  Musculoskeletal: Positive for arthralgias (diffuse) and back pain (lower back)  Negative for gait problem, joint swelling, myalgias, neck pain and neck stiffness  Skin: Negative for rash and wound  Neurological: Positive for dizziness (in evenings) and tremors (restless legs)  Negative for syncope, weakness, light-headedness, numbness and headaches  Objective:      /78 (BP Location: Left arm, Patient Position: Sitting, Cuff Size: Standard)   Pulse 71   Resp 15   SpO2 98%          Physical Exam   Constitutional: She is oriented to person, place, and time  She appears well-developed and well-nourished  HENT:   Head: Normocephalic and atraumatic  Right Ear: External ear normal    Left Ear: External ear normal    Mouth/Throat: Oropharynx is clear and moist    Minor rhinorrhea, no sinus tenderness   Eyes: Pupils are equal, round, and reactive to light   Conjunctivae and EOM are normal  Neck: Normal range of motion  Neck supple  No JVD present  No thyromegaly present  Cardiovascular: Normal rate, regular rhythm, normal heart sounds and intact distal pulses  No murmur heard  Pulmonary/Chest: Effort normal and breath sounds normal  No stridor  No respiratory distress  She has no wheezes  Musculoskeletal: Normal range of motion  She exhibits no edema, tenderness (lumbar back) or deformity  Lymphadenopathy:     She has no cervical adenopathy  Neurological: She is alert and oriented to person, place, and time  She has normal reflexes  She displays normal reflexes  No cranial nerve deficit or sensory deficit  She exhibits normal muscle tone  Coordination normal    mariaelena hallpike negative   Skin: Skin is warm and dry  Vitals reviewed

## 2020-01-13 DIAGNOSIS — I48.0 PAROXYSMAL ATRIAL FIBRILLATION (HCC): ICD-10-CM

## 2020-04-16 DIAGNOSIS — R42 DIZZINESS: ICD-10-CM

## 2020-04-16 RX ORDER — MECLIZINE HCL 12.5 MG/1
TABLET ORAL
Qty: 90 TABLET | Refills: 0 | Status: SHIPPED | OUTPATIENT
Start: 2020-04-16 | End: 2020-11-30 | Stop reason: SDUPTHER

## 2020-07-31 ENCOUNTER — OFFICE VISIT (OUTPATIENT)
Dept: CARDIOLOGY CLINIC | Facility: CLINIC | Age: 70
End: 2020-07-31
Payer: MEDICARE

## 2020-07-31 VITALS — BODY MASS INDEX: 29.65 KG/M2 | HEART RATE: 74 BPM | WEIGHT: 173.7 LBS | HEIGHT: 64 IN | OXYGEN SATURATION: 97 %

## 2020-07-31 DIAGNOSIS — E78.2 MIXED HYPERLIPIDEMIA: ICD-10-CM

## 2020-07-31 DIAGNOSIS — I48.91 ATRIAL FIBRILLATION, UNSPECIFIED TYPE (HCC): Primary | Chronic | ICD-10-CM

## 2020-07-31 DIAGNOSIS — I65.23 CAROTID ARTERY STENOSIS, ASYMPTOMATIC, BILATERAL: ICD-10-CM

## 2020-07-31 DIAGNOSIS — Q21.1 PATENT FORAMEN OVALE: Chronic | ICD-10-CM

## 2020-07-31 DIAGNOSIS — R00.2 PALPITATIONS: ICD-10-CM

## 2020-07-31 PROCEDURE — 4040F PNEUMOC VAC/ADMIN/RCVD: CPT | Performed by: INTERNAL MEDICINE

## 2020-07-31 PROCEDURE — 1036F TOBACCO NON-USER: CPT | Performed by: INTERNAL MEDICINE

## 2020-07-31 PROCEDURE — 1160F RVW MEDS BY RX/DR IN RCRD: CPT | Performed by: INTERNAL MEDICINE

## 2020-07-31 PROCEDURE — 3008F BODY MASS INDEX DOCD: CPT | Performed by: INTERNAL MEDICINE

## 2020-07-31 PROCEDURE — 99214 OFFICE O/P EST MOD 30 MIN: CPT | Performed by: INTERNAL MEDICINE

## 2020-07-31 PROCEDURE — 93000 ELECTROCARDIOGRAM COMPLETE: CPT | Performed by: INTERNAL MEDICINE

## 2020-09-01 ENCOUNTER — TELEPHONE (OUTPATIENT)
Dept: CARDIOLOGY CLINIC | Facility: CLINIC | Age: 70
End: 2020-09-01

## 2020-09-09 ENCOUNTER — HOSPITAL ENCOUNTER (OUTPATIENT)
Dept: RADIOLOGY | Age: 70
Discharge: HOME | End: 2020-09-09
Payer: MEDICARE

## 2020-09-09 VITALS — WEIGHT: 170 LBS | BODY MASS INDEX: 29.02 KG/M2 | HEIGHT: 64 IN

## 2020-09-09 DIAGNOSIS — Z12.31 ENCOUNTER FOR SCREENING MAMMOGRAM FOR MALIGNANT NEOPLASM OF BREAST: ICD-10-CM

## 2020-09-09 DIAGNOSIS — Z12.39 SCREENING FOR BREAST CANCER: ICD-10-CM

## 2020-09-09 PROCEDURE — 77067 SCR MAMMO BI INCL CAD: CPT

## 2020-09-09 PROCEDURE — 77063 BREAST TOMOSYNTHESIS BI: CPT

## 2020-09-10 ENCOUNTER — TELEPHONE (OUTPATIENT)
Dept: INTERNAL MEDICINE CLINIC | Facility: CLINIC | Age: 70
End: 2020-09-10

## 2020-09-10 NOTE — TELEPHONE ENCOUNTER
----- Message from Anand Acosta DO sent at 9/9/2020  1:39 PM EDT -----  Please let patient know her mammogram was normal

## 2020-09-21 DIAGNOSIS — E78.2 MIXED HYPERLIPIDEMIA: ICD-10-CM

## 2020-09-21 RX ORDER — PRAVASTATIN SODIUM 10 MG
10 TABLET ORAL DAILY
Qty: 90 TABLET | Refills: 3 | Status: SHIPPED | OUTPATIENT
Start: 2020-09-21 | End: 2022-01-03 | Stop reason: SDUPTHER

## 2020-10-06 ENCOUNTER — HOSPITAL ENCOUNTER (OUTPATIENT)
Dept: NON INVASIVE DIAGNOSTICS | Facility: CLINIC | Age: 70
Discharge: HOME | End: 2020-10-06
Payer: MEDICARE

## 2020-10-06 DIAGNOSIS — Q21.1 PATENT FORAMEN OVALE: Chronic | ICD-10-CM

## 2020-10-06 DIAGNOSIS — I65.23 CAROTID ARTERY STENOSIS, ASYMPTOMATIC, BILATERAL: ICD-10-CM

## 2020-10-06 DIAGNOSIS — I48.91 ATRIAL FIBRILLATION, UNSPECIFIED TYPE (HCC): Chronic | ICD-10-CM

## 2020-10-06 DIAGNOSIS — R00.2 PALPITATIONS: ICD-10-CM

## 2020-10-06 PROCEDURE — 93306 TTE W/DOPPLER COMPLETE: CPT | Performed by: INTERNAL MEDICINE

## 2020-10-06 PROCEDURE — 93880 EXTRACRANIAL BILAT STUDY: CPT | Performed by: SURGERY

## 2020-10-06 PROCEDURE — 93880 EXTRACRANIAL BILAT STUDY: CPT

## 2020-10-06 PROCEDURE — 93306 TTE W/DOPPLER COMPLETE: CPT

## 2020-11-12 ENCOUNTER — TRANSCRIBE ORDERS (OUTPATIENT)
Dept: LAB | Facility: CLINIC | Age: 70
End: 2020-11-12

## 2020-11-12 ENCOUNTER — LAB (OUTPATIENT)
Dept: LAB | Facility: CLINIC | Age: 70
End: 2020-11-12
Payer: MEDICARE

## 2020-11-12 DIAGNOSIS — I48.20 CHRONIC ATRIAL FIBRILLATION (HCC): ICD-10-CM

## 2020-11-12 DIAGNOSIS — E55.9 VITAMIN D DEFICIENCY: ICD-10-CM

## 2020-11-12 DIAGNOSIS — L08.9 SKIN INFECTION: ICD-10-CM

## 2020-11-12 DIAGNOSIS — Z13.29 SCREENING FOR THYROID DISORDER: ICD-10-CM

## 2020-11-12 DIAGNOSIS — M62.838 MUSCLE SPASM: ICD-10-CM

## 2020-11-12 DIAGNOSIS — Z23 INFLUENZA VACCINE NEEDED: ICD-10-CM

## 2020-11-12 DIAGNOSIS — M25.50 ARTHRALGIA, UNSPECIFIED JOINT: ICD-10-CM

## 2020-11-12 DIAGNOSIS — Z12.39 SCREENING FOR BREAST CANCER: ICD-10-CM

## 2020-11-12 DIAGNOSIS — Z13.1 SCREENING FOR DIABETES MELLITUS: ICD-10-CM

## 2020-11-12 DIAGNOSIS — E21.3 HYPERPARATHYROIDISM (HCC): ICD-10-CM

## 2020-11-12 DIAGNOSIS — E78.2 MIXED HYPERLIPIDEMIA: ICD-10-CM

## 2020-11-12 DIAGNOSIS — K08.89 PAIN, DENTAL: ICD-10-CM

## 2020-11-12 LAB
25(OH)D3 SERPL-MCNC: 33 NG/ML (ref 30–100)
ALBUMIN SERPL BCP-MCNC: 3.6 G/DL (ref 3.5–5)
ALP SERPL-CCNC: 62 U/L (ref 46–116)
ALT SERPL W P-5'-P-CCNC: 26 U/L (ref 12–78)
ANION GAP SERPL CALCULATED.3IONS-SCNC: 8 MMOL/L (ref 4–13)
AST SERPL W P-5'-P-CCNC: 14 U/L (ref 5–45)
BASOPHILS # BLD AUTO: 0.05 THOUSANDS/ΜL (ref 0–0.1)
BASOPHILS NFR BLD AUTO: 1 % (ref 0–1)
BILIRUB SERPL-MCNC: 0.79 MG/DL (ref 0.2–1)
BUN SERPL-MCNC: 18 MG/DL (ref 5–25)
CALCIUM SERPL-MCNC: 9.1 MG/DL (ref 8.3–10.1)
CHLORIDE SERPL-SCNC: 108 MMOL/L (ref 100–108)
CHOLEST SERPL-MCNC: 138 MG/DL (ref 50–200)
CO2 SERPL-SCNC: 28 MMOL/L (ref 21–32)
CREAT SERPL-MCNC: 0.86 MG/DL (ref 0.6–1.3)
EOSINOPHIL # BLD AUTO: 0.16 THOUSAND/ΜL (ref 0–0.61)
EOSINOPHIL NFR BLD AUTO: 3 % (ref 0–6)
ERYTHROCYTE [DISTWIDTH] IN BLOOD BY AUTOMATED COUNT: 13.4 % (ref 11.6–15.1)
EST. AVERAGE GLUCOSE BLD GHB EST-MCNC: 114 MG/DL
GFR SERPL CREATININE-BSD FRML MDRD: 68 ML/MIN/1.73SQ M
GLUCOSE P FAST SERPL-MCNC: 90 MG/DL (ref 65–99)
HBA1C MFR BLD: 5.6 %
HCT VFR BLD AUTO: 43.9 % (ref 34.8–46.1)
HDLC SERPL-MCNC: 45 MG/DL
HGB BLD-MCNC: 14.5 G/DL (ref 11.5–15.4)
IMM GRANULOCYTES # BLD AUTO: 0.02 THOUSAND/UL (ref 0–0.2)
IMM GRANULOCYTES NFR BLD AUTO: 0 % (ref 0–2)
LDLC SERPL CALC-MCNC: 70 MG/DL (ref 0–100)
LYMPHOCYTES # BLD AUTO: 2.24 THOUSANDS/ΜL (ref 0.6–4.47)
LYMPHOCYTES NFR BLD AUTO: 38 % (ref 14–44)
MCH RBC QN AUTO: 31.2 PG (ref 26.8–34.3)
MCHC RBC AUTO-ENTMCNC: 33 G/DL (ref 31.4–37.4)
MCV RBC AUTO: 94 FL (ref 82–98)
MONOCYTES # BLD AUTO: 0.44 THOUSAND/ΜL (ref 0.17–1.22)
MONOCYTES NFR BLD AUTO: 8 % (ref 4–12)
NEUTROPHILS # BLD AUTO: 2.92 THOUSANDS/ΜL (ref 1.85–7.62)
NEUTS SEG NFR BLD AUTO: 50 % (ref 43–75)
NRBC BLD AUTO-RTO: 0 /100 WBCS
PLATELET # BLD AUTO: 214 THOUSANDS/UL (ref 149–390)
PMV BLD AUTO: 10 FL (ref 8.9–12.7)
POTASSIUM SERPL-SCNC: 3.9 MMOL/L (ref 3.5–5.3)
PROT SERPL-MCNC: 6.6 G/DL (ref 6.4–8.2)
PTH-INTACT SERPL-MCNC: 94.1 PG/ML (ref 18.4–80.1)
RBC # BLD AUTO: 4.65 MILLION/UL (ref 3.81–5.12)
SODIUM SERPL-SCNC: 144 MMOL/L (ref 136–145)
TRIGL SERPL-MCNC: 113 MG/DL
TSH SERPL DL<=0.05 MIU/L-ACNC: 1.97 UIU/ML (ref 0.36–3.74)
WBC # BLD AUTO: 5.83 THOUSAND/UL (ref 4.31–10.16)

## 2020-11-12 PROCEDURE — 83970 ASSAY OF PARATHORMONE: CPT

## 2020-11-12 PROCEDURE — 83036 HEMOGLOBIN GLYCOSYLATED A1C: CPT

## 2020-11-12 PROCEDURE — 80061 LIPID PANEL: CPT

## 2020-11-12 PROCEDURE — 82306 VITAMIN D 25 HYDROXY: CPT

## 2020-11-12 PROCEDURE — 85025 COMPLETE CBC W/AUTO DIFF WBC: CPT

## 2020-11-12 PROCEDURE — 84443 ASSAY THYROID STIM HORMONE: CPT

## 2020-11-12 PROCEDURE — 36415 COLL VENOUS BLD VENIPUNCTURE: CPT

## 2020-11-12 PROCEDURE — 80053 COMPREHEN METABOLIC PANEL: CPT

## 2020-11-30 ENCOUNTER — OFFICE VISIT (OUTPATIENT)
Dept: INTERNAL MEDICINE CLINIC | Facility: CLINIC | Age: 70
End: 2020-11-30
Payer: MEDICARE

## 2020-11-30 VITALS
HEIGHT: 64 IN | BODY MASS INDEX: 30.52 KG/M2 | TEMPERATURE: 98 F | OXYGEN SATURATION: 98 % | HEART RATE: 74 BPM | RESPIRATION RATE: 18 BRPM | SYSTOLIC BLOOD PRESSURE: 118 MMHG | DIASTOLIC BLOOD PRESSURE: 78 MMHG | WEIGHT: 178.8 LBS

## 2020-11-30 DIAGNOSIS — K08.89 PAIN, DENTAL: ICD-10-CM

## 2020-11-30 DIAGNOSIS — I65.23 CAROTID ARTERY STENOSIS, ASYMPTOMATIC, BILATERAL: ICD-10-CM

## 2020-11-30 DIAGNOSIS — R42 DIZZINESS: ICD-10-CM

## 2020-11-30 DIAGNOSIS — Z00.00 MEDICARE ANNUAL WELLNESS VISIT, SUBSEQUENT: ICD-10-CM

## 2020-11-30 DIAGNOSIS — Z01.419 ENCOUNTER FOR ANNUAL ROUTINE GYNECOLOGICAL EXAMINATION: ICD-10-CM

## 2020-11-30 DIAGNOSIS — E55.9 VITAMIN D DEFICIENCY: ICD-10-CM

## 2020-11-30 DIAGNOSIS — E21.3 HYPERPARATHYROIDISM (HCC): Chronic | ICD-10-CM

## 2020-11-30 DIAGNOSIS — F32.9 REACTIVE DEPRESSION: ICD-10-CM

## 2020-11-30 DIAGNOSIS — Q21.1 PATENT FORAMEN OVALE: Chronic | ICD-10-CM

## 2020-11-30 DIAGNOSIS — M62.838 MUSCLE SPASM: ICD-10-CM

## 2020-11-30 DIAGNOSIS — E78.2 MIXED HYPERLIPIDEMIA: ICD-10-CM

## 2020-11-30 DIAGNOSIS — Z12.31 ENCOUNTER FOR SCREENING MAMMOGRAM FOR MALIGNANT NEOPLASM OF BREAST: ICD-10-CM

## 2020-11-30 DIAGNOSIS — I48.91 ATRIAL FIBRILLATION, UNSPECIFIED TYPE (HCC): Primary | Chronic | ICD-10-CM

## 2020-11-30 PROCEDURE — G0439 PPPS, SUBSEQ VISIT: HCPCS | Performed by: INTERNAL MEDICINE

## 2020-11-30 PROCEDURE — 99214 OFFICE O/P EST MOD 30 MIN: CPT | Performed by: INTERNAL MEDICINE

## 2020-11-30 RX ORDER — SERTRALINE HYDROCHLORIDE 25 MG/1
25 TABLET, FILM COATED ORAL DAILY
Qty: 90 TABLET | Refills: 3 | Status: SHIPPED | OUTPATIENT
Start: 2020-11-30 | End: 2021-11-30 | Stop reason: SDUPTHER

## 2020-11-30 RX ORDER — MECLIZINE HCL 12.5 MG/1
TABLET ORAL
Qty: 90 TABLET | Refills: 3 | Status: SHIPPED | OUTPATIENT
Start: 2020-11-30 | End: 2021-04-06

## 2020-11-30 RX ORDER — METAXALONE 400 MG/1
400 TABLET ORAL 3 TIMES DAILY PRN
Qty: 10 TABLET | Refills: 3 | Status: SHIPPED | OUTPATIENT
Start: 2020-11-30 | End: 2021-11-30 | Stop reason: SDUPTHER

## 2020-11-30 RX ORDER — AMOXICILLIN 500 MG/1
CAPSULE ORAL
Qty: 8 CAPSULE | Refills: 2 | Status: SHIPPED | OUTPATIENT
Start: 2020-11-30 | End: 2021-11-30 | Stop reason: SDUPTHER

## 2021-01-11 ENCOUNTER — HOSPITAL ENCOUNTER (OUTPATIENT)
Dept: RADIOLOGY | Facility: HOSPITAL | Age: 71
Discharge: HOME | End: 2021-01-11
Payer: MEDICARE

## 2021-01-11 ENCOUNTER — OFFICE VISIT (OUTPATIENT)
Dept: OBGYN CLINIC | Facility: HOSPITAL | Age: 71
End: 2021-01-11
Payer: MEDICARE

## 2021-01-11 ENCOUNTER — TELEPHONE (OUTPATIENT)
Dept: OBGYN CLINIC | Facility: HOSPITAL | Age: 71
End: 2021-01-11

## 2021-01-11 VITALS
HEART RATE: 69 BPM | DIASTOLIC BLOOD PRESSURE: 80 MMHG | BODY MASS INDEX: 30.69 KG/M2 | HEIGHT: 64 IN | SYSTOLIC BLOOD PRESSURE: 126 MMHG

## 2021-01-11 DIAGNOSIS — M81.0 OSTEOPOROSIS, UNSPECIFIED OSTEOPOROSIS TYPE, UNSPECIFIED PATHOLOGICAL FRACTURE PRESENCE: Chronic | ICD-10-CM

## 2021-01-11 DIAGNOSIS — M25.552 PAIN IN LEFT HIP: Primary | ICD-10-CM

## 2021-01-11 DIAGNOSIS — M25.552 PAIN IN LEFT HIP: ICD-10-CM
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
## 2021-01-11 PROCEDURE — 99213 OFFICE O/P EST LOW 20 MIN: CPT | Performed by: PHYSICIAN ASSISTANT

## 2021-01-11 PROCEDURE — 73700 CT LOWER EXTREMITY W/O DYE: CPT

## 2021-01-11 PROCEDURE — 73502 X-RAY EXAM HIP UNI 2-3 VIEWS: CPT

## 2021-01-11 NOTE — TELEPHONE ENCOUNTER
Patient sees Ollie Barriga from Radiology is calling because patient had a STAT CT done and the patient is still there waiting and wanted to know if he can release her there was no fractures found  Spoke with office who advised yes the patient can go home

## 2021-01-11 NOTE — PROGRESS NOTES
Assessment/Plan   Diagnoses and all orders for this visit:    Pain in left hip    Fall from standing, initial encounter    Osteoporosis, unspecified osteoporosis type, unspecified pathological fracture presence    - stat CT r/o occult fracture hip  - protected weight bearing with cane (walker declined)  - follow up with Dr Nata Baez / Sha Wahl / Esau Callahan / Mary Santiago / Ira Saldana   Patient ID: Velia Gupta is a 65 y o  female  Vitals:    01/11/21 1100   BP: 126/80   Pulse: 71     71yo female comes in for an evaluation of her left hip  She was injured three days ago when she tripped and fell in a parking lot  She landed directly on the left hip and has been having sharp groin pain since then  She has a history of osteoporosis and hyperparathyroidism  The pain is sharp in character, moderate in severity, pain does not radiate and is not associated with numbness      The following portions of the patient's history were reviewed and updated as appropriate: allergies, current medications, past family history, past medical history, past social history, past surgical history and problem list   The following portions of the patient's history were reviewed and updated as appropriate: allergies, current medications, past family history, past medical history, past social history, past surgical history and problem list     Review of Systems  Ortho Exam  Past Medical History:   Diagnosis Date    Acid reflux 11/9/2015    Cataract     Cerebrovascular disease, ill-defined, acute     10/13/15    Degeneration of cervical intervertebral disc     Disease of thyroid gland     DVT (deep venous thrombosis) ([facility] [address] )     Heart disease     Meniere syndrome     Migraine     Osteoarthritis     Osteomalacia     Osteoporosis 3/15/2013    Patent foramen ovale     s/p percutaneous closure device    Polyp of sigmoid colon     Sciatica     of the right leg    Skin cancer     right leg (shin)    Stroke syndrome      Past Surgical History:   Procedure Laterality Date    ADENOIDECTOMY      APPENDECTOMY      BREAST EXCISIONAL BIOPSY Left 2000    benign    CARDIAC SURGERY      CATARACT EXTRACTION      CERVICAL BIOPSY  W/ LOOP ELECTRODE EXCISION      Mild Dysplasia, 6/17/14    CHOLECYSTECTOMY      DILATION AND CURETTAGE OF UTERUS      HERNIA REPAIR      KNEE SURGERY      OTHER SURGICAL HISTORY      Treatment of Pelvis, "extensive pelvic surgery)    PATENT FORAMEN OVALE CLOSURE  08/15/2006    Percutaneous    [place] COLONOSCOPY FLX DX W/COLLJ SPEC WHEN PFRMD N/A 9/7/2018    Procedure: COLONOSCOPY;  Surgeon: Keith Agudelo MD;  Location: [facility];   Service: Colorectal    ROTATOR CUFF REPAIR Bilateral     SHOULDER SURGERY      SKIN SURGERY      TONSILLECTOMY      TOTAL ABDOMINAL HYSTERECTOMY Bilateral 2008    age 57    TOTAL ABDOMINAL HYSTERECTOMY      6/17/14    TOTAL ABDOMINAL HYSTERECTOMY W/ BILATERAL SALPINGOOPHORECTOMY Bilateral 2008    age 57    TUBAL LIGATION      UMBILICAL HERNIA REPAIR      6/17/14     Family History   Problem Relation Age of Onset    Heart disease Mother     Thyroid disease Mother         disorder    Glaucoma Mother     Dementia Mother     Diabetes Mother     Arthritis Mother     Atrial fibrillation Mother     Hypertension Mother     Mitral valve prolapse Mother         disorder    Osteoporosis Mother     Cancer Father     Asthma Father     Heart disease Father     Hypertension Father     Coronary artery disease Family     Cerebral palsy Family     Hyperlipidemia Family     Osteoporosis Family     Other Sister         PFO    Colon cancer Maternal Grandmother 36    Breast cancer Paternal Aunt 80    Endometrial cancer Paternal Aunt     Endometrial cancer Paternal Aunt     Breast cancer Cousin 78    Endometrial cancer Cousin 61    No Known Problems Daughter     No Known Problems Paternal Aunt      Social History     Occupational History    Occupation: Medical Professional   Tobacco Use    Smoking status: Never Smoker    Smokeless tobacco: Never Used    Tobacco comment: former smoker, per [employer]   Substance and Sexual Activity    Alcohol use: No     Comment: doesnt drink now as per patient she did in the past, per [employer]    Drug use: No    Sexual activity: Not on file       Review of Systems   Constitutional: Negative  HENT: Negative  Eyes: Negative  Respiratory: Negative  Cardiovascular: Negative  Gastrointestinal: Negative  Endocrine: Negative  Genitourinary: Negative  Musculoskeletal: As below      Allergic/Immunologic: Negative  Neurological: Negative  Hematological: Negative  Psychiatric/Behavioral: Negative  Objective   Physical Exam      I have personally reviewed pertinent films in PACS and my interpretation is no acute displaced fracture  Possible intertroch lucency on 3rd image       · Constitutional: Awake, Alert, Oriented  · Eyes: EOMI  · Psych: Mood and affect appropriate  · Heart: regular rate and rhythm  · Lungs: No audible wheezing  · Abdomen: soft  · Lymph: no lymphedema       left Hip:  - Appearance   ecchymosis: large overlying the greater trochanter  - Palpation   Mild lateral tenderness, worse medially  - ROM   Flexion: 80, ER: 20, IR: 10  - Special Tests   + sharp, severe pain with log rolling   Straight leg raise negative   - Motor   limited by pain  - NVI distally

## 2021-01-12 ENCOUNTER — OFFICE VISIT (OUTPATIENT)
Dept: OBGYN CLINIC | Facility: HOSPITAL | Age: 71
End: 2021-01-12
Payer: MEDICARE

## 2021-01-12 VITALS
BODY MASS INDEX: 30.39 KG/M2 | DIASTOLIC BLOOD PRESSURE: 70 MMHG | HEART RATE: 66 BPM | WEIGHT: 178 LBS | SYSTOLIC BLOOD PRESSURE: 109 MMHG | HEIGHT: 64 IN

## 2021-01-12 DIAGNOSIS — M25.552 PAIN IN LEFT HIP: Primary | ICD-10-CM
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
## 2021-01-12 DIAGNOSIS — S70.02XA CONTUSION OF LEFT HIP, INITIAL ENCOUNTER: ICD-10-CM

## 2021-01-12 PROCEDURE — 99213 OFFICE O/P EST LOW 20 MIN: CPT | Performed by: ORTHOPAEDIC SURGERY

## 2021-01-12 NOTE — PROGRESS NOTES
Assessment:  1  Pain in left hip     2  Fall from standing, subsequent encounter     3  Contusion of left hip, initial encounter         Plan:  The patient displays no worrisome pathology on imaging  She should expect her symptoms for resolve slowly and she can increase her activity accordingly  The patient can follow up as needed and is welcome to return at any point with any new or old issue  To do next visit:  Return if symptoms worsen or fail to improve  The above stated was discussed in layman's terms and the patient expressed understanding  All questions were answered to the patient's satisfaction  Scribe Attestation    I,:  Ruma Greco am acting as a scribe while in the presence of the attending physician :       I,:  Jonnathan Berumen MD personally performed the services described in this documentation    as scribed in my presence :             Subjective:   Sandeep Wiggins is a 65 y o  female who presents for follow up of left hip  She did fall on the left side 1/9/2021  Today she complains of left lateral hip pain with some groin pain  She rates her pain at 0/10 and 7/10 at its worse  She does use Tylenol for pain  She does use a cane for ambulation  She denies past left hip treatment or issues  She does use Xarelto        Review of systems negative unless otherwise specified in HPI    Past Medical History:   Diagnosis Date    Acid reflux 11/9/2015    Cataract     Cerebrovascular disease, ill-defined, acute     10/13/15    Degeneration of cervical intervertebral disc     Disease of thyroid gland     DVT (deep venous thrombosis) ([facility])     Heart disease     Meniere syndrome     Migraine     Osteoarthritis     Osteomalacia     Osteoporosis 3/15/2013    Patent foramen ovale     s/p percutaneous closure device    Polyp of sigmoid colon     Sciatica     of the right leg    Skin cancer     right leg (shin)    Stroke syndrome        Past Surgical History:   Procedure Laterality Date    ADENOIDECTOMY      APPENDECTOMY      BREAST EXCISIONAL BIOPSY Left 2000    benign    CARDIAC SURGERY      CATARACT EXTRACTION      CERVICAL BIOPSY  W/ LOOP ELECTRODE EXCISION      Mild Dysplasia, 6/17/14    CHOLECYSTECTOMY      DILATION AND CURETTAGE OF UTERUS      HERNIA REPAIR      KNEE SURGERY      OTHER SURGICAL HISTORY      Treatment of Pelvis, "extensive pelvic surgery)    PATENT FORAMEN OVALE CLOSURE  08/15/2006    Percutaneous    [place] COLONOSCOPY FLX DX W/COLLJ SPEC WHEN PFRMD N/A 9/7/2018    Procedure: COLONOSCOPY;  Surgeon: Dennie Pang, MD;  Location: [facility];   Service: Colorectal    ROTATOR CUFF REPAIR Bilateral     SHOULDER SURGERY      SKIN SURGERY      TONSILLECTOMY      TOTAL ABDOMINAL HYSTERECTOMY Bilateral 2008    age 57    TOTAL ABDOMINAL HYSTERECTOMY      6/17/14    TOTAL ABDOMINAL HYSTERECTOMY W/ BILATERAL SALPINGOOPHORECTOMY Bilateral 2008    age 57    TUBAL LIGATION      UMBILICAL HERNIA REPAIR      6/17/14       Family History   Problem Relation Age of Onset    Heart disease Mother     Thyroid disease Mother         disorder    Glaucoma Mother     Dementia Mother     Diabetes Mother     Arthritis Mother     Atrial fibrillation Mother     Hypertension Mother     Mitral valve prolapse Mother         disorder    Osteoporosis Mother     Cancer Father     Asthma Father     Heart disease Father     Hypertension Father     Coronary artery disease Family     Cerebral palsy Family     Hyperlipidemia Family     Osteoporosis Family     Other Sister         PFO    Colon cancer Maternal Grandmother 36    Breast cancer Paternal Aunt 80    Endometrial cancer Paternal Aunt     Endometrial cancer Paternal Aunt     Breast cancer Cousin 78    Endometrial cancer Cousin 61    No Known Problems Daughter     No Known Problems Paternal Aunt        Social History     Occupational History    Occupation: Medical Professional   Tobacco Use    Smoking status: Never Smoker    Smokeless tobacco: Never Used    Tobacco comment: former smoker, per [employer]   Substance and Sexual Activity    Alcohol use: No     Comment: doesnt drink now as per patient she did in the past, per [employer]    Drug use: No    Sexual activity: Not on file         Current Outpatient Medications:     amoxicillin (AMOXIL) 500 mg capsule, Take 4 tablets prior to dental procedure, Disp: 8 capsule, Rfl: 2    ascorbic acid (CVS VITAMIN C) 1000 MG tablet, Take 1 tablet by mouth daily, Disp: , Rfl:     budesonide (RINOCORT AQUA) 32 MCG/ACT nasal spray, 1 spray into each nostril daily, Disp: 1 Bottle, Rfl: 1    CALCIUM PO, Take by mouth, Disp: , Rfl:     clotrimazole-betamethasone (LOTRISONE) 1-0 05 % cream, Apply topically 2 (two) times a day as needed (rash), Disp: 45 g, Rfl: 2    desoximetasone (TOPICORT) 0 25 % cream, Apply topically 2 (two) times a day for 30 days, Disp: 30 g, Rfl: 3    EPINEPHrine (EPIPEN) 0 3 mg/0 3 mL SOAJ, INJECT 0 3 ML INTO A MUSCLE ONCE FOR ONE DOSE, Disp: , Rfl: 3    Magnesium 250 MG TABS, Take 1 tablet by mouth daily, Disp: , Rfl:     meclizine (ANTIVERT) 12 5 MG tablet, TAKE 1 TABLET BY MOUTH THREE TIMES DAILY AS NEEDED FOR DIZZINESS, Disp: 90 tablet, Rfl: 3    metaxalone (SKELAXIN) 400 MG tablet, Take 1 tablet (400 mg total) by mouth 3 (three) times a day as needed (back pain) Take 1 tablet daily as needed for back pain, Disp: 10 tablet, Rfl: 3    pramipexole (MIRAPEX) 0 125 mg tablet, Take 1 tablet (0 125 mg total) by mouth 3 (three) times a day, Disp: 270 tablet, Rfl: 0    pravastatin (PRAVACHOL) 10 mg tablet, Take 1 tablet (10 mg total) by mouth daily, Disp: 90 tablet, Rfl: 3    rivaroxaban (XARELTO) 20 mg tablet, Take 1 tablet (20 mg total) by mouth daily, Disp: 90 tablet, Rfl: 2    sertraline (ZOLOFT) 25 mg tablet, Take 1 tablet (25 mg total) by mouth daily, Disp: 90 tablet, Rfl: 3    Vitamin D, Cholecalciferol, 1000 units TABS, Take by mouth 2 (two) times a day, Disp: , Rfl:     Allergies   Allergen Reactions    Docusate Abdominal Pain    Enoxaparin Tachycardia, Syncope and Hypertension    Lorazepam Other (See Comments)     Suicidal     Shellfish Allergy Abdominal Pain and Throat Swelling    Tramadol Angioedema     Other reaction(s): tight jaw    Codeine Sulfate Angioedema    Cortisone Vomiting and Headache    Cyclobenzaprine Tachycardia    Heparin Tachycardia, Syncope and Hypertension    Lactose Diarrhea    Other      Annotation - [id number]: Alcian  Adhesive tape    Tetracycline Angioedema    Vioxx [Rofecoxib] Angioedema    Celecoxib Rash    Chocolate Headache     **per pt not allergic, just gets migranes     Indomethacin Palpitations    Influenza A (H1n1) Monovalent Vaccine      **Per pt she used to have a problem, but no longer allergic to eggs, gets yearly     Neomycin Sulfate [Neomycin] Rash    Neosporin [Neomycin-Bacitracin Zn-Polymyx] Rash    Nitrofurantoin Rash    Propranolol Palpitations    Sulfamethoxazole-Trimethoprim Rash            Vitals:    01/12/21 1404   BP: 109/70   Pulse: 66       Objective:  Physical exam  · General: Awake, Alert, Oriented  · Eyes: Pupils equal, round and reactive to light  · Heart: regular rate and rhythm  · Lungs: No audible wheezing  · Abdomen: soft                    Ortho Exam   Left hip:  Lateral ecchymotic patch 4"x5"  TTP over greater trochanter  Good arc of motion  Patient sits comfortably in chair with hip flexed at 90 degrees  Patient stands from seated position without assistance  Calf compartments soft and supple  Sensation intact  Toes are warm sensate and mobile      Diagnostics, reviewed and taken today if performed as documented: The attending physician has personally reviewed the pertinent films in PACS and interpretation is as follows:  Left hip CT:  No obvious osseus abnormalities  Left hip x-ray:  No fractures  Mild arthritic changes          Procedures, if performed today:    Procedures    None performed      Portions of the record may have been created with voice recognition software  Occasional wrong word or "sound a like" substitutions may have occurred due to the inherent limitations of voice recognition software  Read the chart carefully and recognize, using context, where substitutions have occurred

## 2021-02-04 DIAGNOSIS — I48.0 PAROXYSMAL ATRIAL FIBRILLATION (HCC): ICD-10-CM

## 2021-02-13 DIAGNOSIS — Z23 ENCOUNTER FOR IMMUNIZATION: ICD-10-CM

## 2021-02-17 ENCOUNTER — IMMUNIZATIONS (OUTPATIENT)
Dept: FAMILY MEDICINE CLINIC | Facility: HOSPITAL | Age: 71
End: 2021-02-17

## 2021-02-17 DIAGNOSIS — Z23 ENCOUNTER FOR IMMUNIZATION: Primary | ICD-10-CM

## 2021-02-17 PROCEDURE — 91300 SARS-COV-2 / COVID-19 MRNA VACCINE (PFIZER-BIONTECH) 30 MCG: CPT

## 2021-02-17 PROCEDURE — 0001A SARS-COV-2 / COVID-19 MRNA VACCINE (PFIZER-BIONTECH) 30 MCG: CPT

## 2021-03-09 ENCOUNTER — IMMUNIZATIONS (OUTPATIENT)
Dept: FAMILY MEDICINE CLINIC | Facility: HOSPITAL | Age: 71
End: 2021-03-09

## 2021-03-09 DIAGNOSIS — Z23 ENCOUNTER FOR IMMUNIZATION: Primary | ICD-10-CM

## 2021-03-09 PROCEDURE — 0002A SARS-COV-2 / COVID-19 MRNA VACCINE (PFIZER-BIONTECH) 30 MCG: CPT

## 2021-03-09 PROCEDURE — 91300 SARS-COV-2 / COVID-19 MRNA VACCINE (PFIZER-BIONTECH) 30 MCG: CPT

## 2021-04-06 DIAGNOSIS — R42 DIZZINESS: ICD-10-CM

## 2021-04-06 RX ORDER — MECLIZINE HCL 12.5 MG/1
TABLET ORAL
Qty: 90 TABLET | Refills: 3 | Status: SHIPPED | OUTPATIENT
Start: 2021-04-06 | End: 2021-11-30 | Stop reason: SDUPTHER

## 2021-11-01 ENCOUNTER — OFFICE VISIT (OUTPATIENT)
Dept: CARDIOLOGY CLINIC | Facility: CLINIC | Age: 71
End: 2021-11-01
Payer: MEDICARE

## 2021-11-01 VITALS
WEIGHT: 182 LBS | HEIGHT: 64 IN | DIASTOLIC BLOOD PRESSURE: 68 MMHG | SYSTOLIC BLOOD PRESSURE: 112 MMHG | OXYGEN SATURATION: 97 % | BODY MASS INDEX: 31.07 KG/M2 | HEART RATE: 69 BPM

## 2021-11-01 DIAGNOSIS — E78.2 MIXED HYPERLIPIDEMIA: ICD-10-CM

## 2021-11-01 DIAGNOSIS — Q21.1 PATENT FORAMEN OVALE: Chronic | ICD-10-CM

## 2021-11-01 DIAGNOSIS — I65.23 CAROTID ARTERY STENOSIS, ASYMPTOMATIC, BILATERAL: ICD-10-CM

## 2021-11-01 DIAGNOSIS — I48.91 ATRIAL FIBRILLATION, UNSPECIFIED TYPE (HCC): Primary | ICD-10-CM

## 2021-11-01 DIAGNOSIS — R00.2 PALPITATIONS: ICD-10-CM

## 2021-11-01 PROCEDURE — 93000 ELECTROCARDIOGRAM COMPLETE: CPT | Performed by: INTERNAL MEDICINE

## 2021-11-01 PROCEDURE — 99214 OFFICE O/P EST MOD 30 MIN: CPT | Performed by: INTERNAL MEDICINE

## 2021-11-01 RX ORDER — OMEPRAZOLE 20 MG/1
20 CAPSULE, DELAYED RELEASE ORAL DAILY
COMMUNITY

## 2021-11-15 ENCOUNTER — APPOINTMENT (OUTPATIENT)
Dept: LAB | Facility: CLINIC | Age: 71
End: 2021-11-15
Payer: MEDICARE

## 2021-11-15 DIAGNOSIS — I65.23 CAROTID ARTERY STENOSIS, ASYMPTOMATIC, BILATERAL: ICD-10-CM

## 2021-11-15 DIAGNOSIS — Q21.1 PATENT FORAMEN OVALE: Chronic | ICD-10-CM

## 2021-11-15 DIAGNOSIS — E78.2 MIXED HYPERLIPIDEMIA: ICD-10-CM

## 2021-11-15 DIAGNOSIS — E55.9 VITAMIN D DEFICIENCY: ICD-10-CM

## 2021-11-15 DIAGNOSIS — E21.3 HYPERPARATHYROIDISM (HCC): Chronic | ICD-10-CM

## 2021-11-15 LAB
25(OH)D3 SERPL-MCNC: 46.2 NG/ML (ref 30–100)
ALBUMIN SERPL BCP-MCNC: 3.8 G/DL (ref 3.5–5)
ALP SERPL-CCNC: 60 U/L (ref 46–116)
ALT SERPL W P-5'-P-CCNC: 34 U/L (ref 12–78)
ANION GAP SERPL CALCULATED.3IONS-SCNC: 8 MMOL/L (ref 4–13)
AST SERPL W P-5'-P-CCNC: 18 U/L (ref 5–45)
BASOPHILS # BLD AUTO: 0.05 THOUSANDS/ΜL (ref 0–0.1)
BASOPHILS NFR BLD AUTO: 1 % (ref 0–1)
BILIRUB SERPL-MCNC: 0.9 MG/DL (ref 0.2–1)
BUN SERPL-MCNC: 22 MG/DL (ref 5–25)
CALCIUM SERPL-MCNC: 9 MG/DL (ref 8.3–10.1)
CHLORIDE SERPL-SCNC: 109 MMOL/L (ref 100–108)
CHOLEST SERPL-MCNC: 128 MG/DL (ref 50–200)
CO2 SERPL-SCNC: 27 MMOL/L (ref 21–32)
CREAT SERPL-MCNC: 0.82 MG/DL (ref 0.6–1.3)
EOSINOPHIL # BLD AUTO: 0.19 THOUSAND/ΜL (ref 0–0.61)
EOSINOPHIL NFR BLD AUTO: 4 % (ref 0–6)
ERYTHROCYTE [DISTWIDTH] IN BLOOD BY AUTOMATED COUNT: 13.8 % (ref 11.6–15.1)
GFR SERPL CREATININE-BSD FRML MDRD: 71 ML/MIN/1.73SQ M
GLUCOSE P FAST SERPL-MCNC: 98 MG/DL (ref 65–99)
HCT VFR BLD AUTO: 42.7 % (ref 34.8–46.1)
HDLC SERPL-MCNC: 36 MG/DL
HGB BLD-MCNC: 13.7 G/DL (ref 11.5–15.4)
IMM GRANULOCYTES # BLD AUTO: 0.02 THOUSAND/UL (ref 0–0.2)
IMM GRANULOCYTES NFR BLD AUTO: 0 % (ref 0–2)
LDLC SERPL CALC-MCNC: 69 MG/DL (ref 0–100)
LYMPHOCYTES # BLD AUTO: 1.75 THOUSANDS/ΜL (ref 0.6–4.47)
LYMPHOCYTES NFR BLD AUTO: 34 % (ref 14–44)
MCH RBC QN AUTO: 30.4 PG (ref 26.8–34.3)
MCHC RBC AUTO-ENTMCNC: 32.1 G/DL (ref 31.4–37.4)
MCV RBC AUTO: 95 FL (ref 82–98)
MONOCYTES # BLD AUTO: 0.42 THOUSAND/ΜL (ref 0.17–1.22)
MONOCYTES NFR BLD AUTO: 8 % (ref 4–12)
NEUTROPHILS # BLD AUTO: 2.75 THOUSANDS/ΜL (ref 1.85–7.62)
NEUTS SEG NFR BLD AUTO: 53 % (ref 43–75)
NRBC BLD AUTO-RTO: 0 /100 WBCS
PLATELET # BLD AUTO: 224 THOUSANDS/UL (ref 149–390)
PMV BLD AUTO: 10.2 FL (ref 8.9–12.7)
POTASSIUM SERPL-SCNC: 4.5 MMOL/L (ref 3.5–5.3)
PROT SERPL-MCNC: 6.6 G/DL (ref 6.4–8.2)
PTH-INTACT SERPL-MCNC: 121.7 PG/ML (ref 18.4–80.1)
RBC # BLD AUTO: 4.5 MILLION/UL (ref 3.81–5.12)
SODIUM SERPL-SCNC: 144 MMOL/L (ref 136–145)
TRIGL SERPL-MCNC: 114 MG/DL
WBC # BLD AUTO: 5.18 THOUSAND/UL (ref 4.31–10.16)

## 2021-11-15 PROCEDURE — 85025 COMPLETE CBC W/AUTO DIFF WBC: CPT

## 2021-11-15 PROCEDURE — 82306 VITAMIN D 25 HYDROXY: CPT

## 2021-11-15 PROCEDURE — 80053 COMPREHEN METABOLIC PANEL: CPT

## 2021-11-15 PROCEDURE — 83970 ASSAY OF PARATHORMONE: CPT

## 2021-11-15 PROCEDURE — 36415 COLL VENOUS BLD VENIPUNCTURE: CPT

## 2021-11-15 PROCEDURE — 80061 LIPID PANEL: CPT

## 2021-11-30 ENCOUNTER — OFFICE VISIT (OUTPATIENT)
Dept: INTERNAL MEDICINE CLINIC | Facility: CLINIC | Age: 71
End: 2021-11-30
Payer: MEDICARE

## 2021-11-30 VITALS
BODY MASS INDEX: 31.18 KG/M2 | HEART RATE: 64 BPM | DIASTOLIC BLOOD PRESSURE: 70 MMHG | SYSTOLIC BLOOD PRESSURE: 124 MMHG | HEIGHT: 64 IN | WEIGHT: 182.6 LBS

## 2021-11-30 DIAGNOSIS — Z78.0 ASYMPTOMATIC MENOPAUSAL STATE: ICD-10-CM

## 2021-11-30 DIAGNOSIS — Z12.31 ENCOUNTER FOR SCREENING MAMMOGRAM FOR MALIGNANT NEOPLASM OF BREAST: ICD-10-CM

## 2021-11-30 DIAGNOSIS — M62.838 MUSCLE SPASM: ICD-10-CM

## 2021-11-30 DIAGNOSIS — E21.3 HYPERPARATHYROIDISM (HCC): Primary | ICD-10-CM

## 2021-11-30 DIAGNOSIS — R42 DIZZINESS: ICD-10-CM

## 2021-11-30 DIAGNOSIS — E78.2 MIXED HYPERLIPIDEMIA: ICD-10-CM

## 2021-11-30 DIAGNOSIS — E55.9 VITAMIN D DEFICIENCY: ICD-10-CM

## 2021-11-30 DIAGNOSIS — Z00.00 MEDICARE ANNUAL WELLNESS VISIT, SUBSEQUENT: ICD-10-CM

## 2021-11-30 DIAGNOSIS — I48.91 ATRIAL FIBRILLATION, UNSPECIFIED TYPE (HCC): ICD-10-CM

## 2021-11-30 DIAGNOSIS — F32.9 REACTIVE DEPRESSION: ICD-10-CM

## 2021-11-30 DIAGNOSIS — Z13.820 SCREENING FOR OSTEOPOROSIS: ICD-10-CM

## 2021-11-30 DIAGNOSIS — K08.89 PAIN, DENTAL: ICD-10-CM

## 2021-11-30 DIAGNOSIS — L08.9 SKIN INFECTION: ICD-10-CM

## 2021-11-30 PROCEDURE — G0439 PPPS, SUBSEQ VISIT: HCPCS | Performed by: INTERNAL MEDICINE

## 2021-11-30 PROCEDURE — 99214 OFFICE O/P EST MOD 30 MIN: CPT | Performed by: INTERNAL MEDICINE

## 2021-11-30 PROCEDURE — 1123F ACP DISCUSS/DSCN MKR DOCD: CPT | Performed by: INTERNAL MEDICINE

## 2021-11-30 RX ORDER — AMOXICILLIN 500 MG/1
CAPSULE ORAL
Qty: 8 CAPSULE | Refills: 2 | Status: SHIPPED | OUTPATIENT
Start: 2021-11-30 | End: 2022-12-01

## 2021-11-30 RX ORDER — METAXALONE 400 MG/1
400 TABLET ORAL 3 TIMES DAILY PRN
Qty: 10 TABLET | Refills: 3 | Status: SHIPPED | OUTPATIENT
Start: 2021-11-30 | End: 2022-02-28

## 2021-11-30 RX ORDER — CLOTRIMAZOLE AND BETAMETHASONE DIPROPIONATE 10; .64 MG/G; MG/G
CREAM TOPICAL 2 TIMES DAILY PRN
Qty: 45 G | Refills: 2 | Status: SHIPPED | OUTPATIENT
Start: 2021-11-30 | End: 2021-12-30

## 2021-11-30 RX ORDER — SERTRALINE HYDROCHLORIDE 25 MG/1
25 TABLET, FILM COATED ORAL DAILY
Qty: 90 TABLET | Refills: 3 | Status: SHIPPED | OUTPATIENT
Start: 2021-11-30

## 2021-11-30 RX ORDER — MECLIZINE HCL 12.5 MG/1
12.5 TABLET ORAL 3 TIMES DAILY PRN
Qty: 90 TABLET | Refills: 3 | Status: SHIPPED | OUTPATIENT
Start: 2021-11-30 | End: 2022-06-28 | Stop reason: SDUPTHER

## 2021-11-30 RX ORDER — DESOXIMETASONE 2.5 MG/G
CREAM TOPICAL 2 TIMES DAILY
Qty: 30 G | Refills: 3 | Status: SHIPPED | OUTPATIENT
Start: 2021-11-30 | End: 2021-12-30

## 2022-01-03 DIAGNOSIS — I48.0 PAROXYSMAL ATRIAL FIBRILLATION (HCC): ICD-10-CM

## 2022-01-03 DIAGNOSIS — E78.2 MIXED HYPERLIPIDEMIA: ICD-10-CM

## 2022-01-03 DIAGNOSIS — Z91.030 BEE STING ALLERGY: Primary | ICD-10-CM

## 2022-01-03 RX ORDER — PRAVASTATIN SODIUM 10 MG
10 TABLET ORAL DAILY
Qty: 90 TABLET | Refills: 3 | Status: SHIPPED | OUTPATIENT
Start: 2022-01-03

## 2022-01-03 RX ORDER — EPINEPHRINE 0.3 MG/.3ML
0.3 INJECTION SUBCUTANEOUS ONCE
Qty: 2 EACH | Refills: 3 | Status: SHIPPED | OUTPATIENT
Start: 2022-01-03 | End: 2022-01-03

## 2022-04-05 ENCOUNTER — HOSPITAL ENCOUNTER (OUTPATIENT)
Dept: MAMMOGRAPHY | Facility: HOSPITAL | Age: 72
Discharge: HOME | End: 2022-04-05
Payer: MEDICARE

## 2022-04-05 VITALS — HEIGHT: 64 IN | WEIGHT: 182.54 LBS | BODY MASS INDEX: 31.16 KG/M2

## 2022-04-05 DIAGNOSIS — Z12.31 ENCOUNTER FOR SCREENING MAMMOGRAM FOR MALIGNANT NEOPLASM OF BREAST: ICD-10-CM

## 2022-04-05 PROCEDURE — 77063 BREAST TOMOSYNTHESIS BI: CPT

## 2022-04-05 PROCEDURE — 77067 SCR MAMMO BI INCL CAD: CPT

## 2022-06-28 DIAGNOSIS — R42 DIZZINESS: ICD-10-CM

## 2022-06-28 RX ORDER — MECLIZINE HCL 12.5 MG/1
12.5 TABLET ORAL 3 TIMES DAILY PRN
Qty: 90 TABLET | Refills: 3 | Status: SHIPPED | OUTPATIENT
Start: 2022-06-28

## 2022-07-29 ENCOUNTER — TELEMEDICINE (OUTPATIENT)
Dept: INTERNAL MEDICINE CLINIC | Facility: CLINIC | Age: 72
End: 2022-07-29
Payer: MEDICARE

## 2022-07-29 ENCOUNTER — TELEPHONE (OUTPATIENT)
Dept: OTHER | Facility: OTHER | Age: 72
End: 2022-07-29

## 2022-07-29 ENCOUNTER — TELEPHONE (OUTPATIENT)
Dept: INTERNAL MEDICINE CLINIC | Facility: CLINIC | Age: 72
End: 2022-07-29

## 2022-07-29 DIAGNOSIS — U07.1 COVID-19: Primary | ICD-10-CM

## 2022-07-29 PROCEDURE — 99441 PR PHYS/QHP TELEPHONE EVALUATION 5-10 MIN: CPT | Performed by: INTERNAL MEDICINE

## 2022-07-29 NOTE — TELEPHONE ENCOUNTER
[facility] for PT (of   [facility])  reporting drug interaction with Paxlovid (Xarelto/Pravachol)  They are requesting a call back to advise      On call provider paged via TC

## 2022-07-29 NOTE — TELEPHONE ENCOUNTER
Paged on call per pharmacy's request to ask if PT should hold her xaretlo due to being prescribed plaxovid

## 2022-07-29 NOTE — PROGRESS NOTES
COVID-19 Outpatient Progress Note    Assessment/Plan:    Problem List Items Addressed This Visit    None     Visit Diagnoses     COVID-19    -  Primary    Relevant Medications    nirmatrelvir & ritonavir (Paxlovid) tablet therapy pack       patient presents for an acute visit  She was exposed to her grandson who tested positive for COVID  She ear symptoms started on Wednesday  She has been taking Tylenol  She developed a headache as well as fever and a cough and sore throat with achiness and decreased appetite  She has received both doses of the COVID vaccine  Recommended that she quarantine until Sunday  Will start her on Paxlovid  Patient is aware that she will need to hold her pravastatin for the next 7 days  She is also aware of the potential bleeding risk with Xarelto and paxlovid  Disposition:     Patient has COVID-19 infection  Based off CDC guidelines, they were recommended to isolate for 5 days from the date of the positive test  If they remain asymptomatic, isolation may be ended followed by 5 days of wearing a mask when around othes to minimize risk of infecting others  If they have a fever, continue to stay home until fever resolves for at least 24 hours  I recommended continued isolation until at least 24 hours have passed since recovery defined as resolution of fever without the use of fever-reducing medications AND improvement in COVID symptoms AND 10 days have passed since onset of symptoms (or 10 days have passed since date of first positive viral diagnostic test for asymptomatic patients)  Patient meets criteria for PAXLOVID and they have been counseled appropriately according to EUA documentation released by the FDA  After discussion, patient agrees to treatment      Scott Aleisha is an investigational medicine used to treat mild-to-moderate COVID-19 in adults and children (15years of age and older weighing at least 80 pounds (40 kg)) with positive results of direct SARS-CoV-2 viral testing, and who are at high risk for progression to severe COVID-19, including hospitalization or death  PAXLOVID is investigational because it is still being studied  There is limited information about the safety and effectiveness of using PAXLOVID to treat people with mild-to-moderate COVID-19  The FDA has authorized the emergency use of PAXLOVID for the treatment of mild-tomoderate COVID-19 in adults and children (15years of age and older weighing at least 80 pounds (40 kg)) with a positive test for the virus that causes COVID-19, and who are at high risk for progression to severe COVID-19, including hospitalization or death, under an EUA  What should I tell my healthcare provider before I take PAXLOVID? Tell your healthcare provider if you:  - Have any allergies  - Have liver or kidney disease  - Are pregnant or plan to become pregnant  - Are breastfeeding a child  - Have any serious illnesses    Tell your healthcare provider about all the medicines you take, including prescription and over-the-counter medicines, vitamins, and herbal supplements  Some medicines may interact with PAXLOVID and may cause serious side effects  Keep a list of your medicines to show your healthcare provider and pharmacist when you get a new medicine  You can ask your healthcare provider or pharmacist for a list of medicines that interact with PAXLOVID  Do not start taking a new medicine without telling your healthcare provider  Your healthcare provider can tell you if it is safe to take PAXLOVID with other medicines  Tell your healthcare provider if you are taking combined hormonal contraceptive  PAXLOVID may affect how your birth control pills work  Females who are able to become pregnant should use another effective alternative form of contraception or an additional barrier method of contraception   Talk to your healthcare provider if you have any questions about contraceptive methods that might be right for you     How do I take PAXLOVID? PAXLOVID consists of 2 medicines: nirmatrelvir and ritonavir  - Take 2 pink tablets of nirmatrelvir with 1 white tablet of ritonavir by mouth 2 times each day (in the morning and in the evening) for 5 days  For each dose, take all 3 tablets at the same time  - If you have kidney disease, talk to your healthcare provider  You may need a different dose  - Swallow the tablets whole  Do not chew, break, or crush the tablets  - Take PAXLOVID with or without food  - Do not stop taking PAXLOVID without talking to your healthcare provider, even if you feel better  - If you miss a dose of PAXLOVID within 8 hours of the time it is usually taken, take it as soon as you remember  If you miss a dose by more than 8 hours, skip the missed dose and take the next dose at your regular time  Do not take 2 doses of PAXLOVID at the same time  - If you take too much PAXLOVID, call your healthcare provider or go to the nearest hospital emergency room right away  - If you are taking a ritonavir- or cobicistat-containing medicine to treat hepatitis C or Human Immunodeficiency Virus (HIV), you should continue to take your medicine as prescribed by your healthcare provider   - Talk to your healthcare provider if you do not feel better or if you feel worse after 5 days  Who should generally not take PAXLOVID? Do not take PAXLOVID if:  You are allergic to nirmatrelvir, ritonavir, or any of the ingredients in PAXLOVID      You are taking any of the following medicines:  - Alfuzosin  - Pethidine, piroxicam, propoxyphene  - Ranolazine  - Amiodarone, dronedarone, flecainide, propafenone, quinidine  - Colchicine  - Lurasidone, pimozide, clozapine  - Dihydroergotamine, ergotamine, methylergonovine  - Lovastatin, simvastatin  - Sildenafil (Revatio®) for pulmonary arterial hypertension (PAH)  - Triazolam, oral midazolam  - Apalutamide  - Carbamazepine, phenobarbital, phenytoin  - Rifampin  - St  Mjs Wort (hypericum perforatum)    What are the important possible side effects of PAXLOVID? Possible side effects of PAXLOVID are:  - Liver Problems  Tell your healthcare provider right away if you have any of these signs and symptoms of liver problems: loss of appetite, yellowing of your skin and the whites of eyes (jaundice), dark-colored urine, pale colored stools and itchy skin, stomach area (abdominal) pain  - Resistance to HIV Medicines  If you have untreated HIV infection, PAXLOVID may lead to some HIV medicines not working as well in the future  - Other possible side effects include: altered sense of taste, diarrhea, high blood pressure, or muscle aches    These are not all the possible side effects of PAXLOVID  Not many people have taken PAXLOVID  Serious and unexpected side effects may happen  Michael Rodriguez is still being studied, so it is possible that all of the risks are not known at this time  What other treatment choices are there? Like Sachi Morales may allow for the emergency use of other medicines to treat people with COVID-19  Go to https://[employer]/ for information on the emergency use of other medicines that are authorized by FDA to treat people with COVID-19  Your healthcare provider may talk with you about clinical trials for which you may be eligible  It is your choice to be treated or not to be treated with PAXLOVID  Should you decide not to receive it or for your child not to receive it, it will not change your standard medical care  What if I am pregnant or breastfeeding? There is no experience treating pregnant women or breastfeeding mothers with PAXLOVID  For a mother and unborn baby, the benefit of taking PAXLOVID may be greater than the risk from the treatment  If you are pregnant, discuss your options and specific situation with your healthcare provider      It is recommended that you use effective barrier contraception or do not have sexual activity while taking PAXLOVID  If you are breastfeeding, discuss your options and specific situation with your healthcare provider  How do I report side effects with PAXLOVID? Contact your healthcare provider if you have any side effects that bother you or do not go away  Report side effects to FDA MedWatch at www fda gov/medwatch or call 0-024-HKR6112 or you can report side effects to [facility]  at the contact information provided below  Website Fax number Telephone number   [employer] [phone]     How should I store Joan Glasgow? Store PAXLOVID tablets at room temperature between 68°F to 77°F (20°C to 25°C)  Full fact sheet for patients, parents, and caregivers can be found at: http://[employer]/    I have spent 10 minutes directly with the patient  Greater than 50% of this time was spent in counseling/coordination of care regarding: diagnostic results, prognosis, risks and benefits of treatment options, instructions for management, patient and family education, importance of treatment compliance, risk factor reductions and impressions  Encounter provider Tessy Krishna DO    Provider located at [address] [place] [address]  [employer] [address] [place] [address]  [phone]    Recent Visits  No visits were found meeting these conditions  Showing recent visits within past 7 days and meeting all other requirements  Future Appointments  No visits were found meeting these conditions  Showing future appointments within next 150 days and meeting all other requirements     This virtual check-in was done via telephone and she agrees to proceed      Patient agrees to participate in a virtual check in via telephone or video visit instead of presenting to the office to address urgent/immediate medical needs  Patient is aware this is a billable service  After connecting through Telephone, the patient was identified by name and date of birth  James Addison was informed that this was a telemedicine visit and that the exam was being conducted confidentially over secure lines  My office door was closed  No one else was in the room  James Addison acknowledged consent and understanding of privacy and security of the telemedicine visit  I informed the patient that I have reviewed her record in Epic and presented the opportunity for her to ask any questions regarding the visit today  The patient agreed to participate  It was my intent to perform this visit via video technology but the patient was not able to do a video connection so the visit was completed via audio telephone only  Verification of patient location:  Patient is located in the following state in which I hold an active license: [place]    Subjective:   James Addison is a 66 y o  female who has been screened for COVID-19  Symptom change since last report: unchanged  Patient's symptoms include fever, chills, sore throat, cough and headache  Patient denies fatigue, malaise, congestion, rhinorrhea, anosmia, loss of taste, shortness of breath, chest tightness, abdominal pain, nausea, vomiting, diarrhea and myalgias  - Date of symptom onset: 7/27/2022  - Date of exposure: 7/24/2022  - Date of positive COVID-19 test: 7/29/2022  Type of test: Home antigen  Patient with typical symptoms of COVID-19 and they attest that they were positive on home rapid antigen testing  Image of positive result is not able to be uploaded into their chart  COVID-19 vaccination status: Fully vaccinated (primary series)    Edith Ro has been staying home and has isolated themselves in her home  She is taking care to not share personal items and is cleaning all surfaces that are touched often, like counters, tabletops, and doorknobs using household cleaning sprays or wipes  She is wearing a mask when she leaves her room  No results found for: Jair Lowe, [address] [facility], [address] [facility], [facility], [facility], [facility]  Past Medical History:   Diagnosis Date    Acid reflux 11/9/2015    Arthritis     Cancer ([address] )     Cataract     Cerebrovascular disease, ill-defined, acute     10/13/15    Degeneration of cervical intervertebral disc     Disease of thyroid gland     DVT (deep venous thrombosis) (HCC)     GERD (gastroesophageal reflux disease) 11/9/2015    Headache(784 0)     Heart disease     Memory loss 2006    Meniere syndrome     Migraine     Osteoarthritis     Osteomalacia     Osteoporosis 3/15/2013    Patent foramen ovale     s/p percutaneous closure device    Pneumonia     Polyp of sigmoid colon     Sciatica     of the right leg    Skin cancer     right leg (shin)    Stroke syndrome      Past Surgical History:   Procedure Laterality Date    ADENOIDECTOMY      APPENDECTOMY      BREAST EXCISIONAL BIOPSY Left 2000    benign    CARDIAC SURGERY      CATARACT EXTRACTION      CERVICAL BIOPSY  W/ LOOP ELECTRODE EXCISION      Mild Dysplasia, 6/17/14    CHOLECYSTECTOMY      DILATION AND CURETTAGE OF UTERUS      HERNIA REPAIR      KNEE SURGERY      OTHER SURGICAL HISTORY      Treatment of Pelvis, "extensive pelvic surgery)    PATENT FORAMEN OVALE CLOSURE  08/15/2006    Percutaneous    TX COLONOSCOPY FLX DX W/COLLJ SPEC WHEN PFRMD N/A 9/7/2018    Procedure: COLONOSCOPY;  Surgeon: Keara Michelle MD;  Location: [facility];   Service: Colorectal    ROTATOR CUFF REPAIR Bilateral     SHOULDER SURGERY      SKIN SURGERY      TONSILLECTOMY      TOTAL ABDOMINAL HYSTERECTOMY Bilateral 2008    age 59    TOTAL ABDOMINAL HYSTERECTOMY      6/17/14    TOTAL ABDOMINAL HYSTERECTOMY W/ BILATERAL SALPINGOOPHORECTOMY Bilateral 2008    age 59    TUBAL LIGATION      UMBILICAL HERNIA REPAIR      6/17/14     Current Outpatient Medications   Medication Sig Dispense Refill  meclizine (ANTIVERT) 12 5 MG tablet Take 1 tablet (12 5 mg total) by mouth 3 (three) times a day as needed for dizziness 90 tablet 3    nirmatrelvir & ritonavir (Paxlovid) tablet therapy pack Take 3 tablets by mouth 2 (two) times a day for 5 days Take 2 nirmatrelvir tablets + 1 ritonavir tablet together per dose 30 tablet 0    amoxicillin (AMOXIL) 500 mg capsule Take 4 tablets prior to dental procedure 8 capsule 2    ascorbic acid (CVS VITAMIN C) 1000 MG tablet Take 1 tablet by mouth daily      budesonide (RINOCORT AQUA) 32 MCG/ACT nasal spray 1 spray into each nostril daily (Patient not taking: Reported on 11/1/2021) 1 Bottle 1    CALCIUM PO Take by mouth      clotrimazole-betamethasone (LOTRISONE) 1-0 05 % cream Apply topically 2 (two) times a day as needed (rash) 45 g 2    desoximetasone (TOPICORT) 0 25 % cream Apply topically 2 (two) times a day 30 g 3    EPINEPHrine (EPIPEN) 0 3 mg/0 3 mL SOAJ Inject 0 3 mL (0 3 mg total) into a muscle once for 1 dose 2 each 3    metaxalone (SKELAXIN) 400 MG tablet Take 1 tablet (400 mg total) by mouth 3 (three) times a day as needed (back pain) Take 1 tablet daily as needed for back pain 10 tablet 3    omeprazole (PriLOSEC) 20 mg delayed release capsule Take 20 mg by mouth daily      pravastatin (PRAVACHOL) 10 mg tablet Take 1 tablet (10 mg total) by mouth daily 90 tablet 3    rivaroxaban (Xarelto) 20 mg tablet Take 1 tablet (20 mg total) by mouth daily 90 tablet 2    sertraline (ZOLOFT) 25 mg tablet Take 1 tablet (25 mg total) by mouth daily 90 tablet 3    Vitamin D, Cholecalciferol, 1000 units TABS Take by mouth 2 (two) times a day       No current facility-administered medications for this visit       Allergies   Allergen Reactions    Docusate Abdominal Pain    Enoxaparin Tachycardia, Syncope and Hypertension    Lorazepam Other (See Comments)     Suicidal     Shellfish Allergy - Food Allergy Abdominal Pain and Throat Swelling    Tramadol Angioedema Other reaction(s): tight jaw    Codeine Sulfate Angioedema    Cortisone Vomiting and Headache    Cyclobenzaprine Tachycardia    Heparin Tachycardia, Syncope and Hypertension    Lactose - Food Allergy Diarrhea    Other      Annotation - [id number]: Alcian  Adhesive tape    Tetracycline Angioedema    Vioxx [Rofecoxib] Angioedema    Celecoxib Rash    Chocolate - Food Allergy Headache     **per pt not allergic, just gets migranes     Indomethacin Palpitations    Influenza A (H1n1) Monovalent Vaccine      **Per pt she used to have a problem, but no longer allergic to eggs, gets yearly     Neomycin Sulfate [Neomycin] Rash    Neosporin [Neomycin-Bacitracin Zn-Polymyx] Rash    Nitrofurantoin Rash    Propranolol Palpitations    Sulfamethoxazole-Trimethoprim Rash       Review of Systems   Constitutional: Positive for chills and fever  Negative for fatigue  HENT: Positive for sore throat  Negative for congestion and rhinorrhea  Respiratory: Positive for cough  Negative for chest tightness and shortness of breath  Gastrointestinal: Negative for abdominal pain, diarrhea, nausea and vomiting  Musculoskeletal: Negative for myalgias  Neurological: Positive for headaches  Objective: There were no vitals filed for this visit  Physical Exam    VIRTUAL VISIT [employer] verbally agrees to participate in [employer]  Pt is aware that [employer] could be limited without vital signs or the ability to perform a full hands-on physical Cricket Espinal understands she or the provider may request at any time to terminate the video visit and request the patient to seek care or treatment in person

## 2022-09-06 DIAGNOSIS — I48.0 PAROXYSMAL ATRIAL FIBRILLATION (HCC): ICD-10-CM

## 2022-09-20 DIAGNOSIS — F32.9 REACTIVE DEPRESSION: ICD-10-CM

## 2022-09-20 RX ORDER — SERTRALINE HYDROCHLORIDE 25 MG/1
25 TABLET, FILM COATED ORAL DAILY
Qty: 90 TABLET | Refills: 3 | Status: SHIPPED | OUTPATIENT
Start: 2022-09-20

## 2024-03-04 NOTE — PROGRESS NOTES
Cardiology Follow Up    Trixie Mono  [birth year]  [id number]  [facility] [address] [address]    1  Atrial fibrillation, unspecified type ([address] )  POCT ECG    Echo complete with contrast if indicated   2  Patent foramen ovale  Echo complete with contrast if indicated   3  Mixed hyperlipidemia     4  Palpitations  Echo complete with contrast if indicated   5  Carotid artery stenosis, asymptomatic, bilateral  VAS carotid complete study       Discussion/Summary:  Overall she has been doing well  Blood pressure has been well controlled was 120 over 70 on manual check  Continue anticoagulation for atrial fibrillation she currently maintain sinus  She has a PFO closure device has been about 3 years since her last echo will get imaging this fall  She is also due for carotid Doppler follow-up  Lipids have been doing well continue current statin  Interval History:   Routin follow-up visit  She has a history of paroxysmal atrial fibrillation, PFO status post closure device on anticoagulation  She continues to do well  Remains active around the house taking care of normal daily activities  Denies any chest pain, shortness of breath, palpitations, lightheadedness, dizziness, or syncope  There has been no neurologic symptoms  Denies any lower extremity edema, PND, orthopnea  Been taking all medications as prescribed  No adverse bleeding on anticoagulation      Problem List     Atrial fibrillation ([facility] [address] )    Hyperlipidemia    Palpitations    Patent foramen ovale        Past Medical History:   Diagnosis Date    Acid reflux 11/9/2015    Cataract     Cerebrovascular disease, ill-defined, acute     10/13/15    Degeneration of cervical intervertebral disc     Disease of thyroid gland     DVT (deep venous thrombosis) ([facility])     Heart disease     Meniere syndrome     Migraine     Osteoarthritis Please schedule patient for [facility] wellness visit in Nov 2024. Schedule with Dr. Tay Please send this encounter to Dr. Tay for lab orders once scheduled.     Thank you-  Azar Connor CMA  3/4/2024  Practice Supervisor  [facility]      Osteomalacia     Osteoporosis 3/15/2013    Patent foramen ovale     s/p percutaneous closure device    Polyp of sigmoid colon     Sciatica     of the right leg    Skin cancer     right leg (shin)    Stroke syndrome      Social History     Socioeconomic History    Marital status: /Civil Union     Spouse name: Not on file    Number of children: Not on file    Years of education: Not on file    Highest education level: Not on file   Occupational History    Occupation: Medical Professional   Social Needs    Financial resource strain: Not on file    Food insecurity:     Worry: Not on file     Inability: Not on file    Transportation needs:     Medical: Not on file     Non-medical: Not on file   Tobacco Use    Smoking status: Never Smoker    Smokeless tobacco: Never Used    Tobacco comment: former smoker, per [employer]   Substance and Sexual Activity    Alcohol use: No     Comment: doesnt drink now as per patient she did in the past, per [employer]    Drug use: No    Sexual activity: Not on file   Lifestyle    Physical activity:     Days per week: Not on file     Minutes per session: Not on file    Stress: Not on file   Relationships    Social connections:     Talks on phone: Not on file     Gets together: Not on file     Attends Shinto service: Not on file     Active member of club or organization: Not on file     Attends meetings of clubs or organizations: Not on file     Relationship status: Not on file    Intimate partner violence:     Fear of current or ex partner: Not on file     Emotionally abused: Not on file     Physically abused: Not on file     Forced sexual activity: Not on file   Other Topics Concern    Not on file   Social History Narrative    Caffeine use, active    Daily coffee consumption, 1 cups/day    Job hazardous    Job physical requirements heavy lifting    Work related stress      Family History   Problem Relation Age of Onset    Heart disease Mother    [facility] Thyroid disease Mother         disorder    Glaucoma Mother     Dementia Mother     Diabetes Mother    Jeff Polio Arthritis Mother     Atrial fibrillation Mother     Hypertension Mother     Mitral valve prolapse Mother         disorder    Osteoporosis Mother     Cancer Father     Asthma Father     Heart disease Father     Hypertension Father     Coronary artery disease Family     Cerebral palsy Family     Hyperlipidemia Family     Osteoporosis Family     Other Sister         PFO    Colon cancer Maternal Grandmother 36    Breast cancer Paternal Aunt 80    Endometrial cancer Paternal Aunt     Endometrial cancer Paternal Aunt     Breast cancer Cousin 78    Endometrial cancer Cousin 61     Past Surgical History:   Procedure Laterality Date    ADENOIDECTOMY      APPENDECTOMY      BREAST EXCISIONAL BIOPSY Left 2000    CARDIAC SURGERY      CATARACT EXTRACTION      CERVICAL BIOPSY  W/ LOOP ELECTRODE EXCISION      Mild Dysplasia, 6/17/14    CHOLECYSTECTOMY      DILATION AND CURETTAGE OF UTERUS      HERNIA REPAIR      KNEE SURGERY      OTHER SURGICAL HISTORY      Treatment of Pelvis, "extensive pelvic surgery)    PATENT FORAMEN OVALE CLOSURE  08/15/2006    Percutaneous    NH COLONOSCOPY FLX DX W/COLLJ SPEC WHEN PFRMD N/A 9/7/2018    Procedure: COLONOSCOPY;  Surgeon: Slade Ashley MD;  Location: [facility];   Service: Colorectal    ROTATOR CUFF REPAIR Bilateral     SHOULDER SURGERY      SKIN SURGERY      TONSILLECTOMY      TOTAL ABDOMINAL HYSTERECTOMY Bilateral 2008    age 59    TOTAL ABDOMINAL HYSTERECTOMY      6/17/14    TOTAL ABDOMINAL HYSTERECTOMY W/ BILATERAL SALPINGOOPHORECTOMY Bilateral 2008    age 59    TUBAL LIGATION      UMBILICAL HERNIA REPAIR      6/17/14       Current Outpatient Medications:     amoxicillin (AMOXIL) 500 mg capsule, Take 4 tablets prior to dental procedure, Disp: 8 capsule, Rfl: 2    ascorbic acid (CVS VITAMIN C) 1000 MG tablet, Take 1 tablet by mouth daily, Disp: , Rfl:     budesonide (RINOCORT AQUA) 32 MCG/ACT nasal spray, 1 spray into each nostril daily, Disp: 1 Bottle, Rfl: 1    CALCIUM PO, Take by mouth, Disp: , Rfl:     clotrimazole-betamethasone (LOTRISONE) 1-0 05 % cream, Apply topically 2 (two) times a day as needed (rash), Disp: 45 g, Rfl: 2    desoximetasone (TOPICORT) 0 25 % cream, Apply topically 2 (two) times a day for 30 days, Disp: 30 g, Rfl: 3    EPINEPHrine (EPIPEN) 0 3 mg/0 3 mL SOAJ, INJECT 0 3 ML INTO A MUSCLE ONCE FOR ONE DOSE, Disp: , Rfl: 3    Magnesium 250 MG TABS, Take 1 tablet by mouth daily, Disp: , Rfl:     meclizine (ANTIVERT) 12 5 MG tablet, TAKE 1 TABLET BY MOUTH THREE TIMES DAILY AS NEEDED FOR DIZZINESS, Disp: 90 tablet, Rfl: 0    metaxalone (SKELAXIN) 400 MG tablet, Take 1 tablet (400 mg total) by mouth 3 (three) times a day as needed (back pain) Take 1 tablet daily as needed for back pain, Disp: 10 tablet, Rfl: 2    pramipexole (MIRAPEX) 0 125 mg tablet, Take 1 tablet (0 125 mg total) by mouth 3 (three) times a day, Disp: 270 tablet, Rfl: 0    pravastatin (PRAVACHOL) 10 mg tablet, Take 1 tablet (10 mg total) by mouth daily (Patient taking differently: Take 10 mg by mouth 4 (four) times a week ), Disp: 90 tablet, Rfl: 3    rivaroxaban (XARELTO) 20 mg tablet, Take 1 tablet (20 mg total) by mouth daily, Disp: 90 tablet, Rfl: 2    sertraline (ZOLOFT) 25 mg tablet, Take 1 tablet (25 mg total) by mouth daily, Disp: 90 tablet, Rfl: 3    Vitamin D, Cholecalciferol, 1000 units TABS, Take by mouth 2 (two) times a day, Disp: , Rfl:   Allergies   Allergen Reactions    Docusate Abdominal Pain    Enoxaparin Tachycardia, Syncope and Hypertension    Lorazepam Other (See Comments)     Suicidal     Shellfish Allergy Abdominal Pain and Throat Swelling    Tramadol Angioedema     Other reaction(s): tight jaw    Codeine Sulfate Angioedema    Cortisone Vomiting and Headache    Cyclobenzaprine Tachycardia    Heparin Tachycardia, Syncope and Hypertension    Lactose Diarrhea    Other      Annotation - [id number]: Alcian  Adhesive tape    Tetracycline Angioedema    Vioxx [Rofecoxib] Angioedema    Celecoxib Rash    Chocolate Headache     **per pt not allergic, just gets migranes     Indomethacin Palpitations    Influenza A (H1n1) Monovalent Vaccine      **Per pt she used to have a problem, but no longer allergic to eggs, gets yearly     Neomycin Sulfate [Neomycin] Rash    Neosporin [Neomycin-Bacitracin Zn-Polymyx] Rash    Nitrofurantoin Rash    Propranolol Palpitations    Sulfamethoxazole-Trimethoprim Rash       Labs:     Chemistry        Component Value Date/Time     10/07/2015 0848    K 4 7 11/06/2019 0954    K 4 6 10/07/2015 0848     11/06/2019 0954     (H) 10/07/2015 0848    CO2 28 11/06/2019 0954    CO2 28 10/07/2015 0848    BUN 20 11/06/2019 0954    BUN 23 10/07/2015 0848    CREATININE 0 84 11/06/2019 0954    CREATININE 0 75 10/07/2015 0848        Component Value Date/Time    CALCIUM 9 4 11/06/2019 0954    CALCIUM 9 4 10/07/2015 0848    ALKPHOS 59 11/06/2019 0954    ALKPHOS 41 (L) 10/07/2015 0848    AST 17 11/06/2019 0954    AST 13 10/07/2015 0848    ALT 27 11/06/2019 0954    ALT 29 10/07/2015 0848    BILITOT 0 70 10/07/2015 0848            Lab Results   Component Value Date    CHOL 129 09/04/2014     Lab Results   Component Value Date    HDL 46 11/06/2019    HDL 38 (L) 05/06/2019    HDL 42 11/05/2018     Lab Results   Component Value Date    LDLCALC 82 11/06/2019    LDLCALC 81 05/06/2019    LDLCALC 69 09/04/2014     Lab Results   Component Value Date    TRIG 90 11/06/2019    TRIG 111 05/06/2019    TRIG 125 11/05/2018     No results found for: CHOLHDL    Imaging: Mammo Screening Bilateral W Cad    Result Date: 4/6/2018  Narrative: Patient History: Patient is postmenopausal and has history of other cancer   Family history of premenopausal colorectal cancer at age 36 in maternal grandmother, breast cancer at age 80 and endometrial cancer at age 48 or over in paternal aunt, endometrial cancer at age 48 or over in paternal aunt, breast cancer at age 78 and endometrial cancer at age 61 in paternal cousin  Benign excisional biopsy of the left breast, 2000  Took hormonal contraceptives for 1 year  Took estrogen for 14 years  Patient is a former smoker, and smoked for 1 year  Patient's BMI is 29 2  Reason for exam: screening, asymptomatic  Mammo Screening Bilateral W CAD: April 6, 2018 - Check In #: [de-identified] Bilateral CC and MLO view(s) were taken  Technologist: RT Wolf(R)(M) Prior study comparison: March 17, 2017, mammo screening bilateral W CAD performed at [address] [place] [address]  March 11, 2016, mammo screening bilateral W CAD performed at [address] [place] [address]  January 7, 2015, digital bilateral screening mammogram performed at [address] [place] [address]  January 30, 2013, digital bilateral screening mammogram performed at [address] [place] [address]  January 26, 2012, right breast unilateral diagnostic mammogram, performed at [address] [place] [address]  January 19, 2012, digital bilateral screening mammogram performed at [address] [place] [address]  There are scattered fibroglandular densities  No dominant soft tissue mass, architectural distortion or suspicious calcifications are noted in either breast   The skin and nipple contours are within normal limits  IMPRESSION: No evidence of malignancy  No significant changes when compared with prior studies  ACR BI-RADS® Assessments: BiRad:1 - Negative Recommendation: Routine screening mammogram of both breasts in 1 year  Analyzed by CAD The patient is scheduled in a reminder system for screening mammography  8-10% of cancers will be missed on mammography  Management of a palpable abnormality must be based on clinical grounds  Patients will be notified of their results via letter from our facility  Accredited by [employer] of [employer] and FDA  Transcription Location: [facility] Signing Station: [id number] Risk Value(s): Tyrer-Cuzick 10 Year: 3 700%, Tyrer-Cuzick Lifetime: 6 300%, Myriad Table: 1 5%, DMITRY 5 Year: 2 0%, NCI Lifetime: 6 0%, MRS : Based on personal and/or family history, consideration of hereditary risk assessment may be warranted  ECG:  Normal sinus rhythm unremarkable tracing      Review of Systems   Constitution: Negative  HENT: Negative  Eyes: Negative  Cardiovascular: Negative  Negative for chest pain, dyspnea on exertion, leg swelling, palpitations and syncope  Respiratory: Negative  Endocrine: Negative  Hematologic/Lymphatic: Negative  Skin: Negative  Musculoskeletal: Negative  Gastrointestinal: Negative  Genitourinary: Negative  Neurological: Negative  Psychiatric/Behavioral: Negative  Vitals:    07/31/20 1340   Pulse: 74   SpO2: 97%     Vitals:    07/31/20 1340   Weight: 78 8 kg (173 lb 11 2 oz)     Height: 5' 4" (162 6 cm)   Body mass index is 29 82 kg/m²  Physical Exam:  Vital signs reviewed  General:  Alert and cooperative, appears stated age, no acute distress  HEENT:  PERRLA, EOMI, no scleral icterus, no conjunctival pallor  Neck:  No lymphadenopathy, no thyromegaly, no carotid bruits, no elevated JVP  Heart:  Regular rate and rhythm, normal S1/S2, no S3/S4, no murmur, rubs or gallops  PMI nondisplaced  Lungs:  Clear to auscultation bilaterally, no wheezes rales or rhonchi  Abdomen:  Soft, non-tender, positive bowel sounds, no rebound or guarding,   no organomegaly   Extremities:  Normal range of motion    No clubbing, cyanosis or edema   Vascular:  2+ pedal pulses  Skin:  No rashes or lesions on exposed skin  Neurologic:  Cranial nerves II-XII grossly intact without focal deficits  Psych:  Normal mood and affect